# Patient Record
Sex: FEMALE | Race: WHITE | HISPANIC OR LATINO | Employment: UNEMPLOYED | URBAN - METROPOLITAN AREA
[De-identification: names, ages, dates, MRNs, and addresses within clinical notes are randomized per-mention and may not be internally consistent; named-entity substitution may affect disease eponyms.]

---

## 2023-06-14 ENCOUNTER — APPOINTMENT (OUTPATIENT)
Dept: LAB | Facility: CLINIC | Age: 28
End: 2023-06-14

## 2023-06-14 ENCOUNTER — HOSPITAL ENCOUNTER (OUTPATIENT)
Dept: RADIOLOGY | Facility: HOSPITAL | Age: 28
End: 2023-06-14

## 2023-06-14 DIAGNOSIS — Z3A.09 9 WEEKS GESTATION OF PREGNANCY: ICD-10-CM

## 2023-06-14 LAB
ABO GROUP BLD: NORMAL
BASOPHILS # BLD AUTO: 0.06 THOUSANDS/ÂΜL (ref 0–0.1)
BASOPHILS NFR BLD AUTO: 0 % (ref 0–1)
BLD GP AB SCN SERPL QL: NEGATIVE
EOSINOPHIL # BLD AUTO: 0.01 THOUSAND/ÂΜL (ref 0–0.61)
EOSINOPHIL NFR BLD AUTO: 0 % (ref 0–6)
ERYTHROCYTE [DISTWIDTH] IN BLOOD BY AUTOMATED COUNT: 13.1 % (ref 11.6–15.1)
HBV SURFACE AB SER-ACNC: 357 MIU/ML
HBV SURFACE AG SER QL: NORMAL
HCT VFR BLD AUTO: 37.8 % (ref 34.8–46.1)
HCV AB SER QL: NORMAL
HGB BLD-MCNC: 12.6 G/DL (ref 11.5–15.4)
HIV 1+2 AB+HIV1 P24 AG SERPL QL IA: NORMAL
HIV 2 AB SERPL QL IA: NORMAL
HIV1 AB SERPL QL IA: NORMAL
HIV1 P24 AG SERPL QL IA: NORMAL
IMM GRANULOCYTES # BLD AUTO: 0.12 THOUSAND/UL (ref 0–0.2)
IMM GRANULOCYTES NFR BLD AUTO: 1 % (ref 0–2)
LYMPHOCYTES # BLD AUTO: 1.52 THOUSANDS/ÂΜL (ref 0.6–4.47)
LYMPHOCYTES NFR BLD AUTO: 10 % (ref 14–44)
MCH RBC QN AUTO: 30.9 PG (ref 26.8–34.3)
MCHC RBC AUTO-ENTMCNC: 33.3 G/DL (ref 31.4–37.4)
MCV RBC AUTO: 93 FL (ref 82–98)
MONOCYTES # BLD AUTO: 0.95 THOUSAND/ÂΜL (ref 0.17–1.22)
MONOCYTES NFR BLD AUTO: 6 % (ref 4–12)
NEUTROPHILS # BLD AUTO: 12.67 THOUSANDS/ÂΜL (ref 1.85–7.62)
NEUTS SEG NFR BLD AUTO: 83 % (ref 43–75)
NRBC BLD AUTO-RTO: 0 /100 WBCS
PLATELET # BLD AUTO: 256 THOUSANDS/UL (ref 149–390)
PMV BLD AUTO: 9.1 FL (ref 8.9–12.7)
RBC # BLD AUTO: 4.08 MILLION/UL (ref 3.81–5.12)
RH BLD: POSITIVE
RUBV IGG SERPL IA-ACNC: 164.3 IU/ML
SPECIMEN EXPIRATION DATE: NORMAL
TREPONEMA PALLIDUM IGG+IGM AB [PRESENCE] IN SERUM OR PLASMA BY IMMUNOASSAY: NORMAL
WBC # BLD AUTO: 15.33 THOUSAND/UL (ref 4.31–10.16)

## 2023-06-14 PROCEDURE — 86803 HEPATITIS C AB TEST: CPT

## 2023-06-14 PROCEDURE — 87086 URINE CULTURE/COLONY COUNT: CPT

## 2023-06-14 PROCEDURE — 85025 COMPLETE CBC W/AUTO DIFF WBC: CPT

## 2023-06-14 PROCEDURE — 87389 HIV-1 AG W/HIV-1&-2 AB AG IA: CPT

## 2023-06-14 PROCEDURE — 86706 HEP B SURFACE ANTIBODY: CPT

## 2023-06-14 PROCEDURE — 86850 RBC ANTIBODY SCREEN: CPT

## 2023-06-14 PROCEDURE — 87340 HEPATITIS B SURFACE AG IA: CPT

## 2023-06-14 PROCEDURE — 86901 BLOOD TYPING SEROLOGIC RH(D): CPT

## 2023-06-14 PROCEDURE — 86900 BLOOD TYPING SEROLOGIC ABO: CPT

## 2023-06-14 PROCEDURE — 36415 COLL VENOUS BLD VENIPUNCTURE: CPT

## 2023-06-14 PROCEDURE — 86780 TREPONEMA PALLIDUM: CPT

## 2023-06-14 PROCEDURE — 86762 RUBELLA ANTIBODY: CPT

## 2023-06-15 LAB — BACTERIA UR CULT: NORMAL

## 2023-06-19 ENCOUNTER — ROUTINE PRENATAL (OUTPATIENT)
Dept: OBGYN CLINIC | Facility: CLINIC | Age: 28
End: 2023-06-19

## 2023-06-19 VITALS
RESPIRATION RATE: 18 BRPM | HEART RATE: 87 BPM | SYSTOLIC BLOOD PRESSURE: 98 MMHG | WEIGHT: 178.8 LBS | HEIGHT: 61 IN | BODY MASS INDEX: 33.76 KG/M2 | DIASTOLIC BLOOD PRESSURE: 67 MMHG

## 2023-06-19 DIAGNOSIS — Z34.91 PRENATAL CARE IN FIRST TRIMESTER: Primary | ICD-10-CM

## 2023-06-19 DIAGNOSIS — Z3A.13 13 WEEKS GESTATION OF PREGNANCY: ICD-10-CM

## 2023-06-19 DIAGNOSIS — O23.41 URINARY TRACT INFECTION AFFECTING CARE OF MOTHER IN FIRST TRIMESTER, ANTEPARTUM: ICD-10-CM

## 2023-06-19 PROCEDURE — 87491 CHLMYD TRACH DNA AMP PROBE: CPT | Performed by: OBSTETRICS & GYNECOLOGY

## 2023-06-19 PROCEDURE — 87086 URINE CULTURE/COLONY COUNT: CPT | Performed by: OBSTETRICS & GYNECOLOGY

## 2023-06-19 PROCEDURE — 87591 N.GONORRHOEAE DNA AMP PROB: CPT | Performed by: OBSTETRICS & GYNECOLOGY

## 2023-06-19 PROCEDURE — 99213 OFFICE O/P EST LOW 20 MIN: CPT | Performed by: OBSTETRICS & GYNECOLOGY

## 2023-06-19 PROCEDURE — G0145 SCR C/V CYTO,THINLAYER,RESCR: HCPCS | Performed by: OBSTETRICS & GYNECOLOGY

## 2023-06-19 NOTE — ASSESSMENT & PLAN NOTE
-Prenatal panel components reviewed  -Gonorrhea/chlamydia screening collected today  -Cervical cancer screening collected today  -Ultrasounds and aneuploidy screening: has MFM appt 6/20  -MSAFP screening to be ordered at next routine prenatal visit  -LDASA not indicated  -Postpartum contraception plan is DMPA  -Return to office in 4 weeks; precautions discussed

## 2023-06-19 NOTE — PROGRESS NOTES
[facility]  Prenatal H&P Visit    Problem List Items Addressed This Visit        Genitourinary    Urinary tract infection affecting care of mother in first trimester, antepartum     Staph saphrophyticus on 23  S/p treatment with Keflex  Test of cure collected 23         Relevant Orders    Urine culture       Other    13 weeks gestation of pregnancy     -Prenatal panel components reviewed  -Gonorrhea/chlamydia screening collected today  -Cervical cancer screening collected today  -Ultrasounds and aneuploidy screening: has MFM appt   -MSAFP screening to be ordered at next routine prenatal visit  -LDASA not indicated  -Postpartum contraception plan is DMPA  -Return to office in 4 weeks; precautions discussed             Relevant Orders    Chlamydia/GC amplified DNA by PCR    Liquid-based pap, screening   Other Visit Diagnoses     Prenatal care in first trimester    -  Primary    Relevant Orders    Urine culture    Chlamydia/GC amplified DNA by PCR    Liquid-based pap, screening         # [id number]    Claudine Jaramillo MD  PGY-III, OB/GYN    History of Present Illness   Leoncio Pack is a 24 y o  [id number] who presents for prenatal H&P visit at 13w3d gestation  Planned pregnancy  Final dating by first trimester ultrasound  Denies obstetric complaints including cramping, vaginal bleeding, leakage of fluid  2 previous pregnancies: 1 full term  and 1 SAB  Complications: none  Current medication(s) include prenatal vitamin and Vitamin B6 + Unisom with as needed Tylenol  Pertinent gynecological diagnoses: denies sexually transmitted infections, abnormal pap smears, fibroids, ovarian cysts, or endometriosis  She is unemployed at this time but has a support system at home  She is here today with her   She denies a family history of inheritable conditions such as physical or intellectual disabilities, birth defects, blood disorders, heart or neural tube defects  No recent travel or travel planned in the near future  She denies use of cigarettes, vaping, hookah, alcohol or other recreational/illlicit drug use  Review of Systems   Negative unless stated above    Historical Information     Past Medical History:   Diagnosis Date   • Lumbar hernia        History reviewed  No pertinent surgical history  Family History   Problem Relation Age of Onset   • No Known Problems Mother    • Hypertension Father    • No Known Problems Sister    • No Known Problems Brother    • No Known Problems Son    • Cancer Maternal Grandfather    • Stomach cancer Maternal Uncle        Social History     Socioeconomic History   • Marital status: /Civil Union     Spouse name: Not on file   • Number of children: Not on file   • Years of education: Not on file   • Highest education level: Not on file   Occupational History   • Not on file   Tobacco Use   • Smoking status: Never   • Smokeless tobacco: Never   Vaping Use   • Vaping Use: Never used   Substance and Sexual Activity   • Alcohol use: Not Currently   • Drug use: Never   • Sexual activity: Yes     Partners: Male     Birth control/protection: None   Other Topics Concern   • Not on file   Social History Narrative   • Not on file     Social Determinants of Health     Financial Resource Strain: Medium Risk (6/7/2023)    Overall Financial Resource Strain (CARDIA)    • Difficulty of Paying Living Expenses: Somewhat hard   Food Insecurity: No Food Insecurity (6/7/2023)    Hunger Vital Sign    • Worried About Running Out of Food in the Last Year: Never true    • Ran Out of Food in the Last Year: Never true   Transportation Needs: No Transportation Needs (6/7/2023)    PRAPARE - Transportation    • Lack of Transportation (Medical): No    • Lack of Transportation (Non-Medical):  No   Physical Activity: Not on file   Stress: Not on file   Social Connections: Not on file   Intimate Partner Violence: Not on file   Housing Stability: Not on file Meds/Allergies   No Known Allergies    Objective   Vitals:    06/19/23 0805   BP: 98/67   Pulse: 87   Resp: 18       Physical Exam    Physical Exam:  General:   alert and oriented, in no acute distress, appears stated age and cooperative   Heart: regular rate and rhythm, S1, S2 normal, no murmur, click, rub or gallop   Lungs: clear to auscultation bilaterally   Breasts: breasts appear normal, no suspicious masses, no skin or nipple changes or axillary nodes, not examined   Abdomen: soft, non-tender, without masses or organomegaly   Vulva: normal   Vagina: normal mucosa, normal discharge   Cervix: multiparous appearance, no lesions and small amount of bleeding with pap smear   Uterus: size consistent with 13 weeks   Fetal heart tones: Fetal Heart Rate: 156   Adnexa: no mass, fullness, tenderness     Prenatal Labs: I have personally reviewed pertinent reports

## 2023-06-20 ENCOUNTER — ROUTINE PRENATAL (OUTPATIENT)
Dept: PERINATAL CARE | Facility: OTHER | Age: 28
End: 2023-06-20

## 2023-06-20 VITALS
WEIGHT: 179.8 LBS | HEIGHT: 62 IN | BODY MASS INDEX: 33.09 KG/M2 | DIASTOLIC BLOOD PRESSURE: 64 MMHG | SYSTOLIC BLOOD PRESSURE: 100 MMHG | HEART RATE: 90 BPM

## 2023-06-20 DIAGNOSIS — Z3A.13 13 WEEKS GESTATION OF PREGNANCY: ICD-10-CM

## 2023-06-20 DIAGNOSIS — Z36.82 NUCHAL TRANSLUCENCY OF FETUS ON PRENATAL ULTRASOUND: Primary | ICD-10-CM

## 2023-06-20 LAB — BACTERIA UR CULT: NORMAL

## 2023-06-20 PROCEDURE — 99203 OFFICE O/P NEW LOW 30 MIN: CPT | Performed by: OBSTETRICS & GYNECOLOGY

## 2023-06-20 PROCEDURE — 76813 OB US NUCHAL MEAS 1 GEST: CPT | Performed by: OBSTETRICS & GYNECOLOGY

## 2023-06-20 NOTE — LETTER
2023     Azael Trujillo MD  [facility]  [address] [address]    Patient: Fransisca Tsai   YOB: 1995   Date of Visit: 2023       Dear Dr Shelley Kidd: Thank you for referring Fransisca Tsai to me for evaluation  Below are my notes for this consultation  If you have questions, please do not hesitate to call me  I look forward to following your patient along with you  Sincerely,        Nereyda Harding MD        CC: No Recipients    Nereyda Harding MD  2023 11:18 PM  Sign when Signing Visit  OFFICE CONSULT  Referring physician: Danielle Ramirez Md  [facility]  [facility],  [address] [facility]      Dear Dr Zi Corrales      Thank you for requesting a  consultation on your patient Ms Fransisca Tsai for the following indications:  Genetic screening  I used the interpretor # [id number] to contact the patient by phone after her visit  History  Medications: Prenatal vitamins, vitamin B6, Unisom,  Allergies to medications: None  Past medical history: History of UTI in the beginning of this pregnancy  Past surgical history: None  Past obstetrical history:   10/19/2012 5 pound 11 7 ounce baby boy delivered vaginally  2018 at 6 weeks she had a miscarriage spontaneously  Social history: Does not report substance use  First generation family history: Hypertension in her father    Ultrasound findings: The ultrasound shows a fetus concordant with dates  The nasal bone and nuchal translucency appears normal  No malformations are seen on today's early ultrasound  The patient was informed of the findings and counseled about the limitations of the exam in detecting all forms of fetal congenital abnormalities  She does not report any vaginal bleeding or uterine cramping or contractions  Specific counseling was provided on the following problems:  1   We discussed the options for genetic screening which include invasive testing on the fetal placenta or on fetal skin cells within the amniotic fluid and compared this to noninvasive testing which includes cell free DNA screening and the sequential screen  We reviewed the risks, the benefits and the limitations of each  In the end she is considering cell free DNA  She is aware to contact her office to schedule a nurse visit [phone] for a blood draw once she confirms her interest in cell free DNA  Future tests recommended:  1  The results of her NIPT will return in 7-10 days once the blood is drawn  Screening for spina bifida with an MSAFP screen is a future test that can be prescribed through her OB office  This blood work should be drawn preferably at 16 to 18 weeks so that the results return prior to her next scan  The test though can be run until 21 weeks and 6 days if needed  Future ultrasounds ordered today:   1  Fetal Level II ultrasound imaging is recommended at 19-20 weeks' gestation      Aydee Hewitt MD

## 2023-06-21 LAB
C TRACH DNA SPEC QL NAA+PROBE: NEGATIVE
N GONORRHOEA DNA SPEC QL NAA+PROBE: NEGATIVE

## 2023-06-22 NOTE — RESULT ENCOUNTER NOTE
Spoke to patient to confirm neg urine  GCCT results   Patient stated understanding and had no questions

## 2023-06-23 ENCOUNTER — PATIENT OUTREACH (OUTPATIENT)
Dept: OBGYN CLINIC | Facility: CLINIC | Age: 28
End: 2023-06-23

## 2023-06-23 LAB
LAB AP GYN PRIMARY INTERPRETATION: NORMAL
Lab: NORMAL

## 2023-06-26 ENCOUNTER — TELEPHONE (OUTPATIENT)
Dept: OBGYN CLINIC | Facility: CLINIC | Age: 28
End: 2023-06-26

## 2023-06-26 NOTE — TELEPHONE ENCOUNTER
----- Message from Benjei Julian MD sent at 6/24/2023  3:09 PM EDT -----  Please let Alexander Hall know her pap smear is normal  Next will be due in 3 years  Claudine Tran MD  PGY-III, OB/GYN  6/24/2023, 3:09 PM

## 2023-06-27 ENCOUNTER — PATIENT OUTREACH (OUTPATIENT)
Dept: OBGYN CLINIC | Facility: CLINIC | Age: 28
End: 2023-06-27

## 2023-06-28 ENCOUNTER — CLINICAL SUPPORT (OUTPATIENT)
Facility: HOSPITAL | Age: 28
End: 2023-06-28

## 2023-06-28 DIAGNOSIS — Z33.1 PREGNANT STATE, INCIDENTAL: ICD-10-CM

## 2023-06-28 DIAGNOSIS — Z36.9 UNSPECIFIED ANTENATAL SCREENING: ICD-10-CM

## 2023-06-28 NOTE — PROGRESS NOTES
"Used [employer] ID# [id number] Adelina Westbrook  Patient chose to have [facility] Non-invasive Prenatal Screen with fetal sex  Patient given brochure and is aware [facility] will contact their insurance and coordinate coverage  Patient made aware she will need to respond to text message or e-mail from [employer] within 2 business days or testing will be run through insurance  Patient informed text message will come from area code  \"415\"  Provided [employer] # [phone] and web site : [email or web address]  \"[place] your test online\" card with barcode and test tube ID provided to patient  Reviewed [facility]'s web site states 5-7 business days for results via their portal    Docitt message will be sent to patient when MFM receives results /provider reviews  2 vials of blood drawn from RIGHT  arm by ELYSSA Mak RN  Patient tolerated blood draw without difficulty  Specimens labeled with patient identifiers (name, date of birth, specimen collection date), order and specimen were verified with patient, packed and sent via [employer]  FED EX  tracking #  [id number]  Copy of lab order scanned to Epic media  Maternal Fetal Medicine will have results in approximately 7-10 business days and will call patient or notify via [address]  Patient aware viewing lab result online will reveal fetal sex if ordered  Patient verbalized understanding of all instructions and no questions at this time    "

## 2023-07-06 ENCOUNTER — PATIENT OUTREACH (OUTPATIENT)
Dept: OBGYN CLINIC | Facility: CLINIC | Age: 28
End: 2023-07-06

## 2023-07-06 ENCOUNTER — TELEPHONE (OUTPATIENT)
Dept: PERINATAL CARE | Facility: CLINIC | Age: 28
End: 2023-07-06

## 2023-07-06 NOTE — PROGRESS NOTES
Chart review indicates an order was placed to follow up with patient for prenatal assessment, 1st trimester care. [facility] has attempted multiple times to outreach patient without success. PHQ-9 negative at initial prenatal visit. [facility] outreached 23 yo    woman for prenatal assessment via [employer] ID [id number]. Mom has one other child who is 7 yo. She lives with 7 yo and .  is not father of the 10 you. His father has never been involved. Pregnancy was planned and they were happy when they found out and continue to be excited. They have informed other family members, whom are happy. However, mom does not have friends/family locally as supports as they relocated here from [place] one year ago. Mom is not employed and is not looking for employment. Spouse is employed and provider. They are not eligible for government benefits. Mom only concern is food when the baby comes, she denies need food at this time. [facility] e-mailed her resources for [facility] and sent an e-mail via Find Help with local food pantries. [facility] also spoke to her about the Maternal Home Child Visit program through [facility] due to lack of supports and she would like to be enrolled. She would also like a car seat. She was also interested in agencies for other baby supplies as well, [facility] e-mail her Children Resource List to e-mail on file. Mom expressed concern about  bills. [facility] explained to her that she needs to call the billing office and if she does not qualify for MA then she can apply for [facility] care. She will call the billing office. Mom has approved [facility] SFS application [id number]/79/32-65. Spouse has a car and provides transportation. Mom denies any MH hx. Denies any depression this pregnancy or with prior pregnancy. Mom notes occasion alcohol use prior to pregnancy. No D&A or nicotine during pregnancy. No legal hx. Denies any abuse/C&Y involvement with 7 yo or her own childhood.  Denies being in danger at this time and feels safe in her home. She denied any other needs at this time and will reach out to [facility] if needs arise. ALHAJICM to follow.

## 2023-07-06 NOTE — TELEPHONE ENCOUNTER
----- Message from Roberto Barron MD sent at 7/3/2023 10:54 AM EDT -----  I have reviewed the patient's lab results which are normal.  Please contact the patient to inform her of the normal results, unless Ms.  Leatha Fermin has already reviewed the results using [employer]

## 2023-07-06 NOTE — TELEPHONE ENCOUNTER
Attempted to call Pt. With English line (FRANCES, ID [id number]) but Pt.  Has not set up voicemail on her phone, unable to leave message

## 2023-07-13 ENCOUNTER — TELEPHONE (OUTPATIENT)
Dept: OBGYN CLINIC | Facility: CLINIC | Age: 28
End: 2023-07-13

## 2023-07-13 DIAGNOSIS — Z3A.13 13 WEEKS GESTATION OF PREGNANCY: Primary | ICD-10-CM

## 2023-07-13 NOTE — TELEPHONE ENCOUNTER
Called patient to inform her MSAFP lab order was placed and needs to be completed ASAP, VM is not set up, unable to leave a message.

## 2023-07-19 ENCOUNTER — TELEPHONE (OUTPATIENT)
Facility: HOSPITAL | Age: 28
End: 2023-07-19

## 2023-07-19 NOTE — TELEPHONE ENCOUNTER
Tried calling patient using [address] [address] , Brett Newsome (FS#[id number]), to inform her of the time change for her upcoming appointment on 8/7/23 however there was no answer and her voicemail is not set up yet and she does not have mychart.

## 2023-07-20 ENCOUNTER — ROUTINE PRENATAL (OUTPATIENT)
Dept: OBGYN CLINIC | Facility: CLINIC | Age: 28
End: 2023-07-20

## 2023-07-20 VITALS
SYSTOLIC BLOOD PRESSURE: 113 MMHG | BODY MASS INDEX: 33.68 KG/M2 | DIASTOLIC BLOOD PRESSURE: 54 MMHG | WEIGHT: 183 LBS | HEIGHT: 62 IN | HEART RATE: 81 BPM

## 2023-07-20 DIAGNOSIS — Z3A.17 17 WEEKS GESTATION OF PREGNANCY: Primary | ICD-10-CM

## 2023-07-20 PROCEDURE — 99213 OFFICE O/P EST LOW 20 MIN: CPT | Performed by: OBSTETRICS & GYNECOLOGY

## 2023-07-20 NOTE — PROGRESS NOTES
OB/GYN Prenatal Visit    SUBJECTIVE:   #[id number]   Woodford Cowden is a 24 y.o.  at 17+6 here for prenatal visit. She has no obstetric complaints and denies pelvic pain, cramping/contractions, vaginal bleeding, loss of fluid. Has not yet felt baby move. If you are experiencing painful contractions, loss of fluids, vaginal bleeding, or decreased fetal movement, please call ask to speak to OBGYN doctor on call prior to proceeding to [facility] 2nd floor of [address] [place] [address] or [facility] 3rd floor [facility]). We have a resident doctor on call at both [facility] 24 hours a day. OBJECTIVE:  Vitals:    23 1400   BP: 113/54   Pulse: 81     FHT: 148 bpm    Physical Exam  Constitutional:       Appearance: Normal appearance. HENT:      Head: Normocephalic and atraumatic. Neurological:      Mental Status: She is alert.        ASSESSMENT / PLAN:    Problem List        Genitourinary    Urinary tract infection affecting care of mother in first trimester, antepartum    Overview     Staph saphrophyticus on 23, S/p treatment with Keflex  Test of cure 23 was negative            Other    17 weeks gestation of pregnancy    Overview     -Prenatal labs notable for UTI  -Gonorrhea/chlamydia screening negative   -Cervical cancer screening was NILM   -Ultrasounds: NT scan wnl on   -Aneuploidy screening: considering cell free DNA; if she doesn't want cfDNA, please offer quad screen at next visit   -MSAFP screening to be ordered at next visit  -LDASA not indicated  -Contraception plan is DMPA  -Return to office in 4 weeks           Current Assessment & Plan     Doing well today  cfDNA completed, wnl  MSAFP ordered  Return to office in 4 weeks            Christiano Magaña MD  2023  2:58 PM

## 2023-08-07 ENCOUNTER — ROUTINE PRENATAL (OUTPATIENT)
Facility: HOSPITAL | Age: 28
End: 2023-08-07
Payer: COMMERCIAL

## 2023-08-07 ENCOUNTER — LAB (OUTPATIENT)
Dept: LAB | Facility: CLINIC | Age: 28
End: 2023-08-07
Payer: COMMERCIAL

## 2023-08-07 VITALS
BODY MASS INDEX: 33.84 KG/M2 | SYSTOLIC BLOOD PRESSURE: 108 MMHG | HEART RATE: 94 BPM | DIASTOLIC BLOOD PRESSURE: 62 MMHG | WEIGHT: 185 LBS

## 2023-08-07 DIAGNOSIS — O99.212 MATERNAL OBESITY, ANTEPARTUM, SECOND TRIMESTER: Primary | ICD-10-CM

## 2023-08-07 DIAGNOSIS — Z3A.13 13 WEEKS GESTATION OF PREGNANCY: ICD-10-CM

## 2023-08-07 DIAGNOSIS — Z3A.20 20 WEEKS GESTATION OF PREGNANCY: ICD-10-CM

## 2023-08-07 DIAGNOSIS — Z36.86 ENCOUNTER FOR ANTENATAL SCREENING FOR CERVICAL LENGTH: ICD-10-CM

## 2023-08-07 PROCEDURE — 36415 COLL VENOUS BLD VENIPUNCTURE: CPT

## 2023-08-07 PROCEDURE — 76817 TRANSVAGINAL US OBSTETRIC: CPT | Performed by: OBSTETRICS & GYNECOLOGY

## 2023-08-07 PROCEDURE — 82105 ALPHA-FETOPROTEIN SERUM: CPT

## 2023-08-07 PROCEDURE — 76811 OB US DETAILED SNGL FETUS: CPT | Performed by: OBSTETRICS & GYNECOLOGY

## 2023-08-07 NOTE — LETTER
August 10, 2023     [address] [place] [address] PROVIDER    Patient: Leidy Gordon   YOB: 1995   Date of Visit: 8/7/2023       Dear  Provider: Thank you for referring Leidy Gordon to me for evaluation. Below are my notes for this consultation. If you have questions, please do not hesitate to call me. I look forward to following your patient along with you.          Sincerely,        Amara Bishop MD        CC: No Recipients    Amara Bishop MD  8/10/2023  7:55 AM  Sign when Signing Visit  Please refer to the [facility] ultrasound report in Ob Procedures for additional information regarding today's visit

## 2023-08-07 NOTE — PROGRESS NOTES
Ultrasound Probe Disinfection    A transvaginal ultrasound was performed. Prior to use, disinfection was performed with [employer] Probe serial number A2: [id number] was used.       Josie Horn  08/07/23  1:07 PM

## 2023-08-10 NOTE — PROGRESS NOTES
Please refer to the [facility] ultrasound report in Ob Procedures for additional information regarding today's visit

## 2023-08-11 LAB
2ND TRIMESTER 4 SCREEN SERPL-IMP: NORMAL
AFP ADJ MOM SERPL: 0.73
AFP INTERP AMN-IMP: NORMAL
AFP INTERP SERPL-IMP: NORMAL
AFP INTERP SERPL-IMP: NORMAL
AFP SERPL-MCNC: 40.2 NG/ML
AGE AT DELIVERY: 26.3 YR
GA METHOD: NORMAL
GA: 20.4 WEEKS
IDDM PATIENT QL: NO
MULTIPLE PREGNANCY: NO
NEURAL TUBE DEFECT RISK FETUS: NORMAL %

## 2023-08-21 ENCOUNTER — ROUTINE PRENATAL (OUTPATIENT)
Dept: OBGYN CLINIC | Facility: CLINIC | Age: 28
End: 2023-08-21

## 2023-08-21 VITALS
DIASTOLIC BLOOD PRESSURE: 58 MMHG | BODY MASS INDEX: 34.96 KG/M2 | SYSTOLIC BLOOD PRESSURE: 123 MMHG | WEIGHT: 190 LBS | HEART RATE: 82 BPM | HEIGHT: 62 IN

## 2023-08-21 DIAGNOSIS — Z3A.22 22 WEEKS GESTATION OF PREGNANCY: ICD-10-CM

## 2023-08-21 DIAGNOSIS — Z34.92 PRENATAL CARE IN SECOND TRIMESTER: Primary | ICD-10-CM

## 2023-08-21 PROCEDURE — 99213 OFFICE O/P EST LOW 20 MIN: CPT | Performed by: OBSTETRICS & GYNECOLOGY

## 2023-08-21 NOTE — ASSESSMENT & PLAN NOTE
No obstetric complaints today  Will order 28 week labs at next visit  MSAFP WNL  PNC US - Level II wnl, 28w interval growth/missed anatomy 10/30/23  Contraception: DMPA  Discussed  labor precautions and fetal kick counts    Return to clinic in 4 weeks

## 2023-08-21 NOTE — PROGRESS NOTES
OB/GYN prenatal visit    [employer]  #[id number]    S: 24 y.o. [id number] 22w3d here for PN visit. She has no obstetric complaints, including pelvic pain, contractions, vaginal bleeding, loss of fluid, or decreased fetal movement. O:  Vitals:    23 1600   BP: 123/58   Pulse: 82       Blood Pressure: 123/58  Wt=86.2 kg (190 lb); Body mass index is 34.75 kg/m².; TWG=Not found.   Fetal Heart Rate: 158; Fundal Height (cm): 22 cm    Gen: no acute distress, nonlabored breathing  Abdomen: gravid nontender  Lower extremities: no swelling    A/P:    Problem List Items Addressed This Visit        Other    22 weeks gestation of pregnancy     No obstetric complaints today  Will order 28 week labs at next visit  MSAFP WNL  PNC US - Level II wnl, 28w interval growth/missed anatomy 10/30/23  Contraception: DMPA  Discussed  labor precautions and fetal kick counts    Return to clinic in 4 weeks          Other Visit Diagnoses     Prenatal care in second trimester    -  Primary            Olivia Carranza MD  2023  4:30 PM

## 2023-09-10 PROBLEM — Z3A.25 25 WEEKS GESTATION OF PREGNANCY: Status: ACTIVE | Noted: 2023-06-19

## 2023-09-11 ENCOUNTER — ROUTINE PRENATAL (OUTPATIENT)
Dept: OBGYN CLINIC | Facility: CLINIC | Age: 28
End: 2023-09-11

## 2023-09-11 VITALS
RESPIRATION RATE: 18 BRPM | HEART RATE: 89 BPM | HEIGHT: 62 IN | SYSTOLIC BLOOD PRESSURE: 107 MMHG | BODY MASS INDEX: 35.22 KG/M2 | DIASTOLIC BLOOD PRESSURE: 71 MMHG | WEIGHT: 191.4 LBS

## 2023-09-11 DIAGNOSIS — Z34.92 PRENATAL CARE IN SECOND TRIMESTER: Primary | ICD-10-CM

## 2023-09-11 DIAGNOSIS — Z3A.25 25 WEEKS GESTATION OF PREGNANCY: ICD-10-CM

## 2023-09-11 PROCEDURE — 99213 OFFICE O/P EST LOW 20 MIN: CPT | Performed by: OBSTETRICS & GYNECOLOGY

## 2023-09-11 NOTE — ASSESSMENT & PLAN NOTE
Follow up ultrasound scheduled for 10/30/23  Postpartum contraception plan is DMPA   labor precautions reviewed  Follow up in 3 weeks for 28w visit

## 2023-09-11 NOTE — PROGRESS NOTES
[address] Kristine Hernandez is a [id number] who presents today for routine OB visit at 25w3d. S:  She reports no complaints. Denies uterine contractions or persistent cramping. Denies vaginal bleeding or leaking of fluid. Feeling fetal movement. O:  Blood Pressure: 107/71  Wt=86.8 kg (191 lb 6.4 oz); Body mass index is 35.01 kg/m². Fetal Heart Rate: 148; Fundal Height (cm): 25 cm    Pulm: Non-labored breathing. Abdomen: gravid, soft, non-tender. Extremities: non-tender, no edema. Problem List Items Addressed This Visit        Other    25 weeks gestation of pregnancy     Follow up ultrasound scheduled for 10/30/23  Postpartum contraception plan is DMPA   labor precautions reviewed  Follow up in 3 weeks for 28w visit         Relevant Orders    CBC and Platelet    Glucose, 1H PG    RPR-Syphilis Screening (Total Syphilis IGG/IGM)   Other Visit Diagnoses     Prenatal care in second trimester    -  Primary    Relevant Orders    CBC and Platelet    Glucose, 1H PG    RPR-Syphilis Screening (Total Syphilis IGG/IGM)          Patient to be discussed with Dr. Jaya Rosenberg.   [id number] was used. Claudine Freed MD  PGY-IV, OB/GYN  2023, 4:24 PM

## 2023-09-18 PROBLEM — O23.41 URINARY TRACT INFECTION AFFECTING CARE OF MOTHER IN FIRST TRIMESTER, ANTEPARTUM: Status: RESOLVED | Noted: 2023-06-19 | Resolved: 2023-09-18

## 2023-10-02 ENCOUNTER — ROUTINE PRENATAL (OUTPATIENT)
Dept: OBGYN CLINIC | Facility: CLINIC | Age: 28
End: 2023-10-02

## 2023-10-02 VITALS
DIASTOLIC BLOOD PRESSURE: 56 MMHG | BODY MASS INDEX: 35.88 KG/M2 | WEIGHT: 195 LBS | HEIGHT: 62 IN | SYSTOLIC BLOOD PRESSURE: 114 MMHG | HEART RATE: 86 BPM

## 2023-10-02 DIAGNOSIS — Z34.93 PRENATAL CARE IN THIRD TRIMESTER: Primary | ICD-10-CM

## 2023-10-02 DIAGNOSIS — Z23 NEED FOR TDAP VACCINATION: ICD-10-CM

## 2023-10-02 DIAGNOSIS — Z3A.28 28 WEEKS GESTATION OF PREGNANCY: ICD-10-CM

## 2023-10-02 PROCEDURE — 99214 OFFICE O/P EST MOD 30 MIN: CPT | Performed by: OBSTETRICS & GYNECOLOGY

## 2023-10-02 PROCEDURE — 90471 IMMUNIZATION ADMIN: CPT | Performed by: OBSTETRICS & GYNECOLOGY

## 2023-10-02 PROCEDURE — 90715 TDAP VACCINE 7 YRS/> IM: CPT | Performed by: OBSTETRICS & GYNECOLOGY

## 2023-10-02 NOTE — PROGRESS NOTES
OB/GYN Prenatal Visit    SUBJECTIVE:  Raffy Leon is a 34 y.o.  at 28w3d here for prenatal visit. She has no obstetric complaints and denies pelvic pain, cramping/contractions, vaginal bleeding, loss of fluid, or decreased fetal movement. OBJECTIVE:  Vitals:    10/02/23 1600   BP: 114/56   Pulse: 86     FHT: 151 bpm via doppler  Fundal height: 27.5 cm  SVE: deferred    Physical Exam  Constitutional:       General: She is not in acute distress. Appearance: Normal appearance. She is not ill-appearing. Genitourinary:      Genitourinary Comments: SVE deferred   HENT:      Head: Normocephalic and atraumatic. Mouth/Throat:      Mouth: Mucous membranes are moist. No oral lesions. Eyes:      General: No scleral icterus. Extraocular Movements: Extraocular movements intact. Cardiovascular:      Rate and Rhythm: Normal rate and regular rhythm. Heart sounds: Normal heart sounds. Pulmonary:      Effort: Pulmonary effort is normal. No respiratory distress. Breath sounds: Normal breath sounds. No wheezing, rhonchi or rales. Abdominal:      General: There is no distension. Palpations: Abdomen is soft. Tenderness: There is no abdominal tenderness. There is no guarding. Comments: Gravid   Musculoskeletal:      Right lower leg: No edema. Left lower leg: No edema. Neurological:      General: No focal deficit present. Mental Status: She is alert. Skin:     General: Skin is warm and dry. Psychiatric:         Attention and Perception: Attention normal.         Mood and Affect: Mood normal.         Speech: Speech normal.         Behavior: Behavior normal.         Thought Content: Thought content normal.         Judgment: Judgment normal.   Vitals and nursing note reviewed. Exam conducted with a chaperone present.            ASSESSMENT / PLAN:    Problem List        Other    28 weeks gestation of pregnancy    Overview     -Prenatal labs notable for UTI, neg STEWART   -Gonorrhea/chlamydia screening negative   -Cervical cancer screening was NILM   -Ultrasounds: NT scan wnl on . Level II anatomical survey incomplete on 23. F/u growth and anatomy scheduled for 10/30/23  -Aneuploidy screening: NIPT low risk, MSAFP normal  -LDASA not indicated  -Contraception plan is DMPA  -Vaccinations Tdap received 10/2/23, flu vaccine refused  - Delivery plan  at [facility] with epidural. Delivery consent signed 10/2/23           Current Assessment & Plan     - delivery consent signed.  Reviewed risks and benefits of , OVD, C/S  - T dap received  - flu vaccine discussed and offered, patient refused   - no obstetric complaints today  - encouraged completed of 28 week labs  - discussed upcoming perinatology appointment 10/10/2023  - RTC in 4 weeks or sooner if problems arise        Other Visit Diagnoses     Prenatal care in third trimester    -  Primary    Need for Tdap vaccination                    Kyler Sheriff MD  10/2/2023  7:51 PM

## 2023-10-02 NOTE — ASSESSMENT & PLAN NOTE
- delivery consent signed.  Reviewed risks and benefits of , OVD, C/S  - T dap received  - flu vaccine discussed and offered, patient refused   - no obstetric complaints today  - encouraged completed of 28 week labs  - discussed upcoming perinatology appointment 10/10/2023  - RTC in 4 weeks or sooner if problems arise

## 2023-10-02 NOTE — PROGRESS NOTES
28 week education packet provided to patient on 10/02/23. Included in packet:   Third Trimester paperwork  Delivery consent   Birthing room support person rules and acknowledgment  Birth Plan   Welcome information  Birth certificate worksheet   Consent for Photographers  Perineal/ Vaginal massage   Pediatric practices and locations     [employer] # [id number]  Pt will Breast feed / pt is self pay  TDAP given today  Declined Flu Vaccine

## 2023-10-13 ENCOUNTER — LAB (OUTPATIENT)
Dept: LAB | Facility: CLINIC | Age: 28
End: 2023-10-13
Payer: COMMERCIAL

## 2023-10-13 DIAGNOSIS — O99.810 ABNORMAL GLUCOSE TOLERANCE IN PREGNANCY: Primary | ICD-10-CM

## 2023-10-13 DIAGNOSIS — Z3A.25 25 WEEKS GESTATION OF PREGNANCY: ICD-10-CM

## 2023-10-13 DIAGNOSIS — Z34.92 PRENATAL CARE IN SECOND TRIMESTER: ICD-10-CM

## 2023-10-13 LAB
ERYTHROCYTE [DISTWIDTH] IN BLOOD BY AUTOMATED COUNT: 12.9 % (ref 11.6–15.1)
GLUCOSE 1H P 50 G GLC PO SERPL-MCNC: 170 MG/DL (ref 40–134)
HCT VFR BLD AUTO: 38.6 % (ref 34.8–46.1)
HGB BLD-MCNC: 12.1 G/DL (ref 11.5–15.4)
MCH RBC QN AUTO: 29.5 PG (ref 26.8–34.3)
MCHC RBC AUTO-ENTMCNC: 31.3 G/DL (ref 31.4–37.4)
MCV RBC AUTO: 94 FL (ref 82–98)
PLATELET # BLD AUTO: 276 THOUSANDS/UL (ref 149–390)
PMV BLD AUTO: 8.7 FL (ref 8.9–12.7)
RBC # BLD AUTO: 4.1 MILLION/UL (ref 3.81–5.12)
TREPONEMA PALLIDUM IGG+IGM AB [PRESENCE] IN SERUM OR PLASMA BY IMMUNOASSAY: NORMAL
WBC # BLD AUTO: 9.31 THOUSAND/UL (ref 4.31–10.16)

## 2023-10-13 PROCEDURE — 82950 GLUCOSE TEST: CPT

## 2023-10-13 PROCEDURE — 36415 COLL VENOUS BLD VENIPUNCTURE: CPT

## 2023-10-13 PROCEDURE — 86780 TREPONEMA PALLIDUM: CPT

## 2023-10-13 PROCEDURE — 85027 COMPLETE CBC AUTOMATED: CPT

## 2023-10-13 NOTE — RESULT ENCOUNTER NOTE
Spoke to patient to advised of elevated 1 hr Glucose exam and order placed for 3 hr exam that is fasted 8hrs fasted. Patient also informed of CBC wnl and pending syphilis screen.  Maria Luisa Do stated understanding and had no further questions

## 2023-10-14 NOTE — RESULT ENCOUNTER NOTE
Negative syphilis screening. Please let patient know. Claudine Torres MD  PGY-IV, OB/GYN  10/13/2023, 8:24 PM

## 2023-10-16 ENCOUNTER — TELEPHONE (OUTPATIENT)
Dept: OBGYN CLINIC | Facility: CLINIC | Age: 28
End: 2023-10-16

## 2023-10-16 NOTE — TELEPHONE ENCOUNTER
----- Message from Dev Fernandes MD sent at 10/13/2023  8:24 PM EDT -----  Negative syphilis screening. Please let patient know. Claudine Alfredo MD  PGY-IV, OB/GYN  10/13/2023, 8:24 PM

## 2023-10-16 NOTE — TELEPHONE ENCOUNTER
Called patient to inform of negative syphilis screening, unable to leave message, VM not set up. Please let patient know if she returns call.

## 2023-10-20 ENCOUNTER — LAB (OUTPATIENT)
Dept: LAB | Facility: CLINIC | Age: 28
End: 2023-10-20
Payer: COMMERCIAL

## 2023-10-20 DIAGNOSIS — O99.810 ABNORMAL GLUCOSE TOLERANCE IN PREGNANCY: ICD-10-CM

## 2023-10-20 DIAGNOSIS — O99.810 ABNORMAL GLUCOSE TOLERANCE IN PREGNANCY: Primary | ICD-10-CM

## 2023-10-20 PROBLEM — Z3A.31 31 WEEKS GESTATION OF PREGNANCY: Status: ACTIVE | Noted: 2023-06-19

## 2023-10-20 LAB
GLUCOSE 1H P 100 G GLC PO SERPL-MCNC: 202 MG/DL (ref 65–179)
GLUCOSE 2H P 100 G GLC PO SERPL-MCNC: 181 MG/DL (ref 65–154)
GLUCOSE 3H P 100 G GLC PO SERPL-MCNC: 115 MG/DL (ref 65–139)
GLUCOSE P FAST SERPL-MCNC: 82 MG/DL (ref 65–94)

## 2023-10-20 PROCEDURE — 82952 GTT-ADDED SAMPLES: CPT

## 2023-10-20 PROCEDURE — 82951 GLUCOSE TOLERANCE TEST (GTT): CPT

## 2023-10-20 PROCEDURE — 36415 COLL VENOUS BLD VENIPUNCTURE: CPT

## 2023-10-20 NOTE — RESULT ENCOUNTER NOTE
Abnormal 3-hr GTT. Referral placed to diabetes education. Please let patient know. Thanks,  Claudine Clark MD  PGY-IV, OB/GYN  10/20/2023, 1:02 PM

## 2023-10-27 ENCOUNTER — OFFICE VISIT (OUTPATIENT)
Facility: HOSPITAL | Age: 28
End: 2023-10-27

## 2023-10-27 VITALS
HEART RATE: 94 BPM | DIASTOLIC BLOOD PRESSURE: 66 MMHG | SYSTOLIC BLOOD PRESSURE: 110 MMHG | OXYGEN SATURATION: 98 % | WEIGHT: 195.1 LBS | BODY MASS INDEX: 35.68 KG/M2

## 2023-10-27 DIAGNOSIS — O24.410 DIET CONTROLLED GESTATIONAL DIABETES MELLITUS (GDM) IN THIRD TRIMESTER: Primary | ICD-10-CM

## 2023-10-27 DIAGNOSIS — R73.09 ELEVATED GLUCOSE: ICD-10-CM

## 2023-10-27 DIAGNOSIS — O99.810 ABNORMAL GLUCOSE TOLERANCE IN PREGNANCY: ICD-10-CM

## 2023-10-27 DIAGNOSIS — Z3A.32 32 WEEKS GESTATION OF PREGNANCY: ICD-10-CM

## 2023-10-27 PROCEDURE — G0108 DIAB MANAGE TRN  PER INDIV: HCPCS

## 2023-10-27 RX ORDER — BLOOD-GLUCOSE METER
EACH MISCELLANEOUS
Qty: 100 STRIP | Refills: 4 | Status: SHIPPED | OUTPATIENT
Start: 2023-10-27 | End: 2023-12-22

## 2023-10-27 RX ORDER — LANCETS 30 GAUGE
EACH MISCELLANEOUS
Qty: 1 KIT | Refills: 0 | Status: SHIPPED | OUTPATIENT
Start: 2023-10-27 | End: 2023-12-22

## 2023-10-27 RX ORDER — LANCETS 33 GAUGE
EACH MISCELLANEOUS
Qty: 100 EACH | Refills: 4 | Status: SHIPPED | OUTPATIENT
Start: 2023-10-27 | End: 2023-12-22

## 2023-10-27 NOTE — PROGRESS NOTES
CLASS 1 - Individual  (in-person office visit )    Thank you for referring your patient to [facility] Maternal Fetal Medicine Diabetes and Pregnancy Program.     Subjective:     Teri Garces is a 34 y.o. female who presents today unaccompanied for Gestational Diabetes   Algerian language line used. Interpretor # - [id number]    Patient is at 32w0d gestation, Estimated Date of Delivery: 23. Learns best by: Reading/Writing (Note Taking, Reading Provided Booklet)  Primary Support Person: Spouse  Maximiliano with stress by: Reading and Walking  How do you feel the diabetes diagnosis will affect the rest of your pregnancy? Answer: "Its difficult to get used to another lifestyle, but it is necessary "     Reviewed and updated the following from patients medical record: Demographics, Education, Occupation, PMH, Problem List, Allergies, and Current Medications. D&P Assessment responses have been reported throughout the following note. Visit Diagnosis:  Encounter Diagnosis     ICD-10-CM    1. Diet controlled gestational diabetes mellitus (GDM) in third trimester  O24.410       2. 32 weeks gestation of pregnancy  Z3A.32       3. Elevated glucose  R73.09            Discussed with patient:  Pathophysiology of Diet controlled gestational diabetes mellitus (GDM) in third trimester [O24.410]. Untreated hyperglycemia in pregnancy and maternal fetal complications (fetal macrosomia,  hypoglycemia, polyhydramnios, increased incidence of  section,  labor, and in severe cases fetal demise and still birth). Importance of blood glucose monitoring, nutrition, and medication if necessary in achieving BG goals. HPI    Diabetes Hx:   Personal History? No  Family History of Diabetes?  No     Family History   Problem Relation Age of Onset    No Known Problems Mother     Hypertension Father     No Known Problems Sister     No Known Problems Brother     No Known Problems Son     Cancer Maternal Grandfather     Stomach cancer Maternal Uncle         Pregnancy Overview:   OB History    Para Term  AB Living   3 1 1   1 1   SAB IAB Ectopic Multiple Live Births           1      # Outcome Date GA Lbr Adan/2nd Weight Sex Delivery Anes PTL Lv   3 Current            2 AB 2018 6w0d          1 Term 10/19/12   2600 g (5 lb 11.7 oz) M Vag-Spont  N MIKHAIL       Pregnancy Plan:      Delivery Plans  Planned delivery method: Vaginal  Planned delivery location: [facility] L&D  Planned anesthesia: None  Acceptable blood products: All     Post-Delivery Plans  Feeding intentions: Breast Milk     Labs  GDM LABS:  1 Hour GTT:   Lab Results   Component Value Date/Time    [id number] 170 (H) 10/13/2023 10:21 AM      3 Hour GTT:   Lab Results   Component Value Date/Time    GLUF 82 10/20/2023 09:05 AM    OUXNALC2MQ 202 (H) 10/20/2023 10:17 AM    TADBDZO8HQ 181 (H) 10/20/2023 11:13 AM    XCTLGYP4CL 115 10/20/2023 12:05 PM        A1C:  No results found for: "HGBA1C"     Labs Ordered This Visit: None    Current Outpatient Medications    Current Outpatient Medications:     doxylamine (UNISOM) 25 MG tablet, Take 0.5 tablets (12.5 mg total) by mouth daily at bedtime as needed for sleep (Patient not taking: Reported on 2023), Disp: 30 tablet, Rfl: 0    Prenatal Vit-DSS-Fe Fum-FA (Prenatal 19) tablet, Take 1 tablet by mouth daily, Disp: , Rfl:     Pyridoxine HCl (vitamin B-6) 25 MG tablet, Take 1 tablet (25 mg total) by mouth daily (Patient not taking: Reported on 2023), Disp: 30 tablet, Rfl: 3     Anthropometrics:  Ht Readings from Last 1 Encounters:   10/02/23 5' 2" (1.575 m)      Wt Readings from Last 3 Encounters:   10/02/23 88.5 kg (195 lb)   23 86.8 kg (191 lb 6.4 oz)   23 86.2 kg (190 lb)        Pre-Gravid Wt Pre-Gravid BMI TWG   Pregravid weight not on file Could not be calculated Not found.      Total Pregnancy Weight Gain Recommendations:  could not be calculated   Current Wt Status Compared to Recommendations: *Women should gain 2-5lbs in first trimester; Rate of wt gain in 2nd and 3rd trimester depends on TWG recommendations based on pregravid BMI and height    Most Recent Ultrasound Results:  Findings: NML Growth/NELLI - 8/7/23   Further Fetal Surveillance: None  Next US date: Scheduled Appropriately - 10/30/23     Blood Glucose Monitoring:     Glucose Meter: OneTouch Verio Flex   *orders for supplies (meter, lancets, strips) based on patients needs pended/routed to appropriate provider after today's visit for review/approval     Instructed on Testing Blood Sugars: 4 x per day (Fasting, 2 hour after start of each meal)  Goals: (Fasting) 60-95mg/dL // (2hr PP) <120mg/dL  Meter Teaching: Gave instruction on site selection, skin preparation, loading strips and lancet device, meter activation, obtaining blood sample, test strip and lancet disposal and storage, and recording log book entries. Blood Sugar Tested in Office? No due to no meter   Instructed to report blood sugar results weekly via Phone: [phone] OR [employer] (Message with image attachment, or Glucose Flowsheet)    Meal Plan: Patient was provided with a meal plan including 3 meals and 3 snacks  *Calories: 1800 calorie (CHO: 64-86-66-09-63-41) (PRO: 2-1-3-1-3-2)    Review of Patient's Current Diet:  Type of Diet: Regular   Special or ethnic dietary preferences? no  Food Allergies: None  Food Intolerances: None  Food Dislikes: None  Receiving [facility] or food stamps? Applied/Pending [facility] JACKY)  Frequency of dining out/getting take out? 2 times a month     Typical Timing/Frequency of Meals and Snacks:   # Meals Daily: 3  Timing of Meals: Breakfast: 8am  Lunch: 1pm  Dinner: 7-8pm  # Snacks Daily? 2  Timing of Snacks:  irregular     Meal Plan Tips Reviewed at Today's Visit:  Appropriate amounts of CHO, PRO, and Fat at each meal and snack.    CHO exchange list, and portion sizes for both CHO and PRO via food models  How to read a food label  Suggested meal/snack options to increase nutrition and maintain consistent meal and snack intakes  Eat every 2.0-3.5 hours while awake  Go no longer than 8-10 hours fasting overnight until first meal of the day. Physical Activity:  Currently physically active? Yes, Walking irregularly    Reviewed w/ Pt:   Benefits of physical activity to optimize blood glucose control, encouraged activity at patient is physically able. Instructed pt to always consult a physician prior to starting an exercise program.   Recommend 20-30 minutes daily. Patient Stated Goal: "I will plan my meals and snacks every day"    Expected Compliance: excellent  Barriers to Learning/Change: Language  Diabetes Self Management Support Plan (outside of ongoing care): Spouse/Family     Date to Report Blood Sugars: Day Before Class 2, Then Weekly (till delivery)    Class 2: 11/3/23  *Patient instructed to bring 3 day food diary and/or write down foods associated with elevated after meal blood sugars    Begin Time: 1:00 pm     End Time: 2:20pm    It was a pleasure working with them today. Please feel free to call ([phone]) with any questions or concerns.     Ana Rosa Kunz RN   Diabetes Educator  [facility]  Diabetes and Pregnancy Program  [address] [facility], [address] [place] [address], [address] [facility]

## 2023-10-27 NOTE — PATIENT INSTRUCTIONS
Welcome to The Diabetes and Pregnancy Program at [address] [place] [address] team consists of Myself, [facility] Srinivasan[address] and two certified dieticians/educators: Constantine Sosa). Our medical assistants can be reached at [phone],  Monday thru Friday 8 am - 4:15 pm. We look forward to helping you manage your gestational diabetes during pregnancy. As we discussed and reviewed during class 1 please follow our instructions. Self-monitoring Blood Glucose: OneTouch Verio Flex  Always carry your meter and testing supplies with you at all times. Bring you glucose meter to all your appointments in our office. Check your blood sugar - fasting and 2 hours after the first bite of your meal.   Total of 4 blood sugars each day  Fasting: No less than 8 hours and no more than 10 hours from your bedtime snack. Fasting glucose should be checked when you wake up and before you start your day. Check before you have anything to eat or drink. Goal:  Fasting blood sugar (FBS):  60-90 mg/dL  1 hour after meals:  140 or less   2 hour after meals:  120 or less     Report blood sugars by 11/2/23 . Please only pick ONE way to report to our team. Choose the best option for you. We need your report to be legible with dates and blood sugar readings for FBS and 2 hours after breakfast, lunch and dinner to be distinguished. * 1. [employer] glucose flow sheet: Click on "heart/chart symbol" on the bottom toolbar, center option. You will now see an option for "track my health". Fill out accordingly and remember to hit the save button to submit your readings. *         2. Voicemail [phone]  If no response in 24- 48 hours call again        3. [employer] message - Send to [address]. (Medical question - non urgent)   Can include up to 3 images/attachments per message  If using the One Touch Application for your cell phone please only submit a screen shot of electronic logbook "classic".    Automatically logs and organizes your blood glucose results. How to check your blood sugar:  Wash your hands with soap and water or use waterless  to clean your hands. Alcohol can dry your fingers and is not recommended. Alcohol can also cause false, elevated glucose readings. AVOID scented soaps   Gather your glucose meter kit and supplies. Prepare your meter by inserting a new test strip. This will automatically turn on your meter  Make sure test strips are not . Use a new test strip each time. Prepare your lancing device by inserting the lancet               After the lancet is securely in place, twist off the top to reveal needle. Reattach lancing device top   Once lancing device top is reattached, you are now ready to prick the side of your fingertip to get a blood sample. You can adjust the depth setting as needed. We recommend to start at "4". Apply the blood sample to test strip according to instructions. Make sure your sharp container is: heavy duty plastic, puncture-resistant lid, upright and stable, leak resistant, properly labeled. Record your blood sugar   For references and video: go to - http://www.Simply Measured/    Diet:  Please follow your 1800 calorie (CHO: 89-63-23-51-32-81) (PRO:2-1-3-1-3-2) (see attachment). -Remember 15 grams of carbohydrates = 1 serving of CHO   -Carbohydrates need to be paired with 7 grams or 1 ounce of protein for balance.  -Food label:   -Start by checking the serving size on the label of packaged foods and drinks. -The amount of calories, carbs, and fats on the label is based on one serving. Be careful as many items contain more than one serving per package. First step is to check total sugars. Remember total sugar should equal 15 grams or less. Second step, check the total grams (g) of carbs in one serving. (See CHO graph conversion chart as reference).    You can calculate the number of servings of carbs in one serving by dividing the total carbs by 15. For example, if a food has 30 g of total carbs, it would be equal to 2 servings of carbs. -Carbohydrates always need to be paired with protein.  -You should be consuming a minimum of 175 grams of carbohydrates daily to avoid ketosis. -Remember to eat 3 meals and 3 snacks a day. Eating every 2 to 3.5 hours during the day. -Be sure to have bedtime snack that includes at least 30 grams of carbohydrates and 2 ounces (14 grams) of protein.  -Shop around the outside edge of the grocery store. This includes fresh fruits and vegetables, bulk grains, fresh meats, and fresh dairy.  -Use low-heat cooking methods, such as baking, instead of high-heat cooking methods like deep frying.  -Cook using healthy oils, such as olive, canola, or sunflower oil.  -Eat meals and snacks regularly, preferably at the same times every day. Avoid going long periods of time without eating.              -Eat some foods each day that contain healthy fats, such as avocado, nuts, seeds, and fish.  -Always check the nutrition information of foods labeled as "low-fat" or "nonfat". These foods may be higher in added sugar or refined carbs and should be avoided. Blood sugars:   -Report to our team on a weekly basis until you deliver. - If ok by your OB try adding a 20-30 minute walk after a meal to help keep glucose within range.  -Continue to follow up with your OB and MFM providers as recommended.  -Stay in close contact with diabetes education team.   -Always have glucose available for hypoglycemia, use 15 by 15 rule. See attachment.   -While sick or feeling ill, follow our sick day guidelines in our GDM booklet. See attachment.   -For travel and dining out tips refer to your GDM booklet. See attachment. Before your class 2 on 11/3/23 try to keep a 3 day food log. Include everything you drink and eat for breakfast, AM snack, lunch, PM snack, dinner and bedtime snack.  Our dietician will review during class 2 and can provide feedback. Very important to maintain tight glucose control during pregnancy to decrease risk factors including fetal macrosomia; birth injury; risk of ; polyhydramnios; pre-term labor; pre-eclampsia;  hypoglycemia; jaundice and stillbirth. Any questions give us a call at [phone] or send us a MyChart message to [facility], [address] [place] [address].    Enedelia Murphy RN  The Diabetes and Pregnancy Program at [address] [place] [address]

## 2023-10-30 ENCOUNTER — TELEPHONE (OUTPATIENT)
Facility: HOSPITAL | Age: 28
End: 2023-10-30

## 2023-10-30 ENCOUNTER — ULTRASOUND (OUTPATIENT)
Facility: HOSPITAL | Age: 28
End: 2023-10-30

## 2023-10-30 VITALS
HEIGHT: 62 IN | SYSTOLIC BLOOD PRESSURE: 118 MMHG | BODY MASS INDEX: 35.7 KG/M2 | HEART RATE: 94 BPM | WEIGHT: 194 LBS | DIASTOLIC BLOOD PRESSURE: 70 MMHG

## 2023-10-30 DIAGNOSIS — O24.410 DIET CONTROLLED GESTATIONAL DIABETES MELLITUS (GDM) IN THIRD TRIMESTER: Primary | ICD-10-CM

## 2023-10-30 DIAGNOSIS — Z3A.32 32 WEEKS GESTATION OF PREGNANCY: ICD-10-CM

## 2023-10-30 PROCEDURE — 76816 OB US FOLLOW-UP PER FETUS: CPT | Performed by: OBSTETRICS & GYNECOLOGY

## 2023-10-30 PROCEDURE — 99213 OFFICE O/P EST LOW 20 MIN: CPT | Performed by: OBSTETRICS & GYNECOLOGY

## 2023-10-30 RX ORDER — LANCETS 30 GAUGE
EACH MISCELLANEOUS
Qty: 1 KIT | Refills: 0 | Status: SHIPPED | OUTPATIENT
Start: 2023-10-30

## 2023-10-30 NOTE — TELEPHONE ENCOUNTER
Called patient in regard to glucose meter. Received message from pharmacy ([facility] Odin Balderas)  Onetouch Meter out of stock - on back order. Called patient to confirm if she already purchased onetouch test strips and lancets. She confirmed she did purchase them. I asked if she would give me permission to order meter to a different pharmacy at this time in that case, instead of ordering different  meter. She requested [facility]. . Meter reordered to this pharmacy at this time.

## 2023-10-30 NOTE — PROGRESS NOTES
The patient was seen today for an ultrasound. Please see ultrasound report (located under Ob Procedures) for additional details. Thank you very much for allowing us to participate in the care of this very nice patient. Should you have any questions, please do not hesitate to contact me. [place]ef[place] [address] [facility]  Attending Physician, [address] [place] [address]

## 2023-10-31 ENCOUNTER — ROUTINE PRENATAL (OUTPATIENT)
Dept: OBGYN CLINIC | Facility: CLINIC | Age: 28
End: 2023-10-31

## 2023-10-31 VITALS
HEART RATE: 76 BPM | BODY MASS INDEX: 36.07 KG/M2 | DIASTOLIC BLOOD PRESSURE: 71 MMHG | SYSTOLIC BLOOD PRESSURE: 132 MMHG | HEIGHT: 62 IN | WEIGHT: 196 LBS

## 2023-10-31 DIAGNOSIS — Z3A.32 32 WEEKS GESTATION OF PREGNANCY: ICD-10-CM

## 2023-10-31 DIAGNOSIS — Z23 NEED FOR INFLUENZA VACCINATION: ICD-10-CM

## 2023-10-31 DIAGNOSIS — Z29.11 NEED FOR RSV VACCINATION: ICD-10-CM

## 2023-10-31 DIAGNOSIS — Z34.93 PRENATAL CARE IN THIRD TRIMESTER: Primary | ICD-10-CM

## 2023-10-31 NOTE — PROGRESS NOTES
OB/GYN Prenatal Visit    SUBJECTIVE:  Jay Jackson is a 34 y.o.  at 32w4d here for prenatal visit. She has no obstetric complaints and denies pelvic pain, cramping/contractions, vaginal bleeding, loss of fluid, or decreased fetal movement.      OBJECTIVE:  Vitals:    10/31/23 1500   BP: 132/71   Pulse: 76       FHT: 147 bpm  Fundal height: 32 cm  SVE: declined by patient       ASSESSMENT / PLAN:    Problem List       32 weeks gestation of pregnancy    Overview     Overview:  Labs  Pap smear NILM ; GC/CT neg   Prenatal panel UTI, neg STEWART   28w labs wnl  Vaccines:  Flu vaccine: will receive 10/31  RSV vaccine: will receive 10/31  Tdap vaccine: received 10/2  Genetic screening NIPT wnl, MSAFP wnl  TWG 18 lb  Pre-gravid BMI 30  Recommended weight gain  15-25 lb  Contraception: depo  Feeding plan: breast/bottle together  Birth plan:  with epidural  Delivery consent: signed 10/2/23  Ultrasounds: Level 1  wnl, level II 8/723 incomplete, f/u growth 10/30 wnl    Assessment and Plan:  No obstetric complaints  Discussed  labor precautions and fetal kick counts  Return to clinic in 2 weeks          Current Assessment & Plan     Overview:  Labs  Pap smear NILM ; GC/CT neg   Prenatal panel UTI, neg STEWART   28w labs wnl  Vaccines:  Flu vaccine: will receive 10/31  RSV vaccine: will receive 10/31  Tdap vaccine: received 10/2  Genetic screening NIPT wnl, MSAFP wnl  TWG 18 lb  Pre-gravid BMI 30  Recommended weight gain  15-25 lb  Contraception: depo  Feeding plan: breast/bottle together  Birth plan:  with epidural  Delivery consent: signed 10/2/23  Ultrasounds: Level 1  wnl, level II 8/723 incomplete, f/u growth 10/30    Assessment and Plan:  No obstetric complaints  Discussed  labor precautions and fetal kick counts  Return to clinic in 2 weeks          Abnormal glucose tolerance in pregnancy    Overview     1-hr  10/13/23  3-hr GTT with abnormal 1-hr and 2-hr values.  Referral placed to Diabetes Education 10/20/23         Diet controlled gestational diabetes mellitus (GDM) in third trimester    Current Assessment & Plan       No results found for: "HGBA1C"            Mateo Harding DO  10/31/2023  4:27 PM

## 2023-10-31 NOTE — ASSESSMENT & PLAN NOTE
Overview:  Labs  Pap smear NILM ; GC/CT neg   Prenatal panel UTI, neg STEWART   28w labs wnl  Vaccines:  Flu vaccine: will receive 10/31  RSV vaccine: will receive 10/31  Tdap vaccine: received 10/2  Genetic screening NIPT wnl, MSAFP wnl  TWG 18 lb  Pre-gravid BMI 30  Recommended weight gain  15-25 lb  Contraception: depo  Feeding plan: breast/bottle together  Birth plan:  with epidural  Delivery consent: signed 10/2/23  Ultrasounds: Level 1  wnl, level II  incomplete, f/u growth 10/30    Assessment and Plan:  No obstetric complaints  Discussed  labor precautions and fetal kick counts  Return to clinic in 2 weeks

## 2023-11-03 ENCOUNTER — OFFICE VISIT (OUTPATIENT)
Dept: PERINATAL CARE | Facility: CLINIC | Age: 28
End: 2023-11-03

## 2023-11-03 VITALS
HEIGHT: 62 IN | HEART RATE: 98 BPM | BODY MASS INDEX: 35.66 KG/M2 | SYSTOLIC BLOOD PRESSURE: 110 MMHG | WEIGHT: 193.8 LBS | DIASTOLIC BLOOD PRESSURE: 60 MMHG

## 2023-11-03 DIAGNOSIS — O24.410 DIET CONTROLLED GESTATIONAL DIABETES MELLITUS (GDM) IN THIRD TRIMESTER: Primary | ICD-10-CM

## 2023-11-03 DIAGNOSIS — Z3A.32 32 WEEKS GESTATION OF PREGNANCY: ICD-10-CM

## 2023-11-03 DIAGNOSIS — E66.09 OTHER OBESITY DUE TO EXCESS CALORIES AFFECTING PREGNANCY IN THIRD TRIMESTER: ICD-10-CM

## 2023-11-03 DIAGNOSIS — O99.213 OTHER OBESITY DUE TO EXCESS CALORIES AFFECTING PREGNANCY IN THIRD TRIMESTER: ICD-10-CM

## 2023-11-03 PROCEDURE — G0109 DIAB MANAGE TRN IND/GROUP: HCPCS | Performed by: DIETITIAN, REGISTERED

## 2023-11-03 NOTE — PROGRESS NOTES
Thank you for referring your patient to [facility] Maternal Fetal Medicine Diabetes in Pregnancy Program.     Lizzeth Moncada is a  34 y.o. female who presents today for Individual  Class 2. Patient is at 33w0d gestation, Estimated Date of Delivery: 12/22/23. Luxembourgish speaking mostly--used [employer] interpretor Elier Loera #[id number] for 1/2 hour till connection was lost totally. Then used patient's cell phone interpenetrating monique. For 1/2 hour     Reviewed and updated the following from patients medical record: PMH, Problem List, Allergies, and Current Medications. Visit Diagnosis:  Diet controlled GDM    Additional Pregnancy Complications:  Obesity    Labs:    Lab Results   Component Value Date    [id number] 170 (H) 10/13/2023       Lab Results   Component Value Date    GLUF 82 10/20/2023    DPLVPMH4DO 202 (H) 10/20/2023    KYLQUIY4XV 181 (H) 10/20/2023    YQBLUZW7LI 115 10/20/2023        No components found for: "HGA1C"    Medications:  No diabetes related medications    Anthropometrics:  Ht Readings from Last 3 Encounters:   11/03/23 5' 2" (1.575 m)   10/31/23 5' 2" (1.575 m)   10/30/23 5' 2" (1.575 m)     Wt Readings from Last 3 Encounters:   11/03/23 87.9 kg (193 lb 12.8 oz)   10/31/23 88.9 kg (196 lb)   10/30/23 88 kg (194 lb 0.1 oz)     Pre-gravid weight: 185 pounds --5/12/23  Pre-gravid BMI: 34.99  Weight Change: Gained 8.75 pounds  Weight gain recommendations: BMI (> 30) 11-20 lbs  Comments: Patient may gain 11 more pounds for the remainder of the pregnancy. Recent Ultra Sound Results:  Date:10/30/23  Fetal Growth:Normal  NELLI: Normal  Next US date: 12/5/23    Blood Glucose Monitoring:  Reinforcement of blood glucose goals and reporting guidelines.      Glucose Meter: OneTouch Verio Flex  Testing blood sugars: 4 x per day (Fasting, 2 hour after start of each meal)  Method of reporting blood sugars:Glucose Flowsheet    Report blood sugar results weekly via:  Phone: [phone]   OR  My Chart (Message with image attachment, or Glucose Flowsheet)    Goal Blood Sugar Ranges:   Fastin-90 mg/dL  1 hour after the start of each meal: 140 mg/dL or less  2 hours after start of each meal: 120 mg/dL or less      BG Log:  Review of blood glucose log. Discussed at Class 2 today      Meal Plan:  Calories: 1800 calorie (CHO: 61-83-03-33-00-55) (PRO:2-1-3-1-3-2)    Diet Type: Low Carbohydrate  Additional Nutrition concerns: Eats nmiostly Turkmen foods. Does dine out sometimes for lunch. Avoids peanut butter  & cereal. Will eat pizza 1 time monthly. 24 hr Diet Recall:  Provided at this classl    Beverages: water & agreed to 4 oz natural juice only if her BG is controlled. Diet review: Eats 3 meals & 3 snacks. Needs to add protein to mid-afternoon snack  & 1 CHO serving to dinner. Has been following the meal plan farily close. o    Diet Instruction:  The patient was instructed on the following:  Individualized meal plan. Importance of consistent carbohydrate intake via 3 meals and 3 snacks per day   Importance of protein as it relates to blood glucose control. Encouraged  patient to eat every 2.0-3.5 hours while awake  Encouraged patient to go no longer than 8-10 hours fasting overnight until first meal of the day. Provided suggested meal/snack options to increase nutrition and maintain consistent meal and snack intakes. Physical Activity:  Discussed benefits of physical activity to optimize blood glucose control, encouraged activity at patient is physically able. Always consult a physician prior to starting an exercise program. Recommend 20-30 minutes daily. Is patient physically active? Yes daily for 20 minutes    Sick day Guidelines:   Patient advised that sickness will raise blood sugar and need to continue medication regimen as directed. If blood sugar is > 160 mg/dL twice in one day call doctor. Instructed on what to do when unable to consume normal meal plan.      Hypoglycemia & Treatment Guidelines:  Reviewed what hypoglycemia is, signs and symptoms, and how to treat via the 15:15 rule. Post-Partum Guidelines:  Completion of 75 gm CHO 2 hr gtt at 6 weeks post-partum to check for Type 2 DM diagnosis    Breastfeeding Guidelines:  Continue GDM meal plan plus additional 350-500 calories daily. Stay hydrated by drinking 8-10 (8 oz.) fluids daily. Examples of protein and carbohydrate snacks provided. Dining Out & Travel Guidelines:  Patient advised to be prepared with extra diabetes supplies, medications, and snacks, as well as sticking to the same time schedule and portions eaten at home for meals and snacks. Maternal-Fetal Testing:    Ultrasounds- growth scans every 4 weeks. NST- twice weekly starting at 32nd week GA   NELLI- weekly starting at 32 weeks GA    Patient Stated Goal: "I will plan my meals and snacks every day"  Goal Assessment: On track    Diabetes Self Management Support Plan outside of ongoing care: Spouse/Family    Learner/s Present:Learners Present: Patient  and Spouse  Barriers to Learning/Change: Cultural, Financial, and Language  Expected Compliance: good    Date to report blood sugars: weekly  Follow up date: As needed    Begin Time: 1:20 PM  End Time: 2:30 PM    It was a pleasure working with them today. Please feel free to call with any questions or concerns.     Roberth Ramos, MS, RD, [address] [facility]  Diabetes Educator  [facility] Maternal Fetal Medicine  Diabetes in Pregnancy Program  [address] [place] [address] 24, [address] [facility], [address] [facility]

## 2023-11-14 PROBLEM — Z3A.34 34 WEEKS GESTATION OF PREGNANCY: Status: ACTIVE | Noted: 2023-06-19

## 2023-11-14 NOTE — ASSESSMENT & PLAN NOTE
Overview:  Labs  Pap smear NILM ; GC/CT neg   Prenatal panel UTI, neg STEWART   28w labs wnl  GBS due at 36 weeks   Vaccines:  Flu vaccine: will receive 10/31  RSV vaccine: will receive 10/31  Tdap vaccine: received 10/2  Genetic screening NIPT wnl, MSAFP wnl  TWG 18 lb  Pre-gravid BMI 30  Recommended weight gain  15-25 lb  Contraception: depo  Feeding plan: breast/bottle together  Birth plan:  with epidural  Delivery consent: signed 10/2/23  Ultrasounds: Level 1  wnl, level II  incomplete, f/u growth 10/30    Assessment and Plan:  No obstetric complaints; but reporting external vaginal itchiness likely 2/2 to shaving given no other associated symptoms.  Return precaution dicussed    Discussed  labor precautions and fetal kick counts  Return to clinic in 1 weeks

## 2023-11-15 ENCOUNTER — DOCUMENTATION (OUTPATIENT)
Dept: PERINATAL CARE | Facility: CLINIC | Age: 28
End: 2023-11-15

## 2023-11-15 NOTE — PROGRESS NOTES
Date: 11/15/23  Doug Pereyra  1995  Estimated Date of Delivery: 12/22/23  34w5d  [facility]    Diagnosis:  Diet controlled GDM    Blood Sugar Logs Submitted via: Glucose Flowsheet        Assessment and Plan:  No diabetes related medications  1800 calorie (CHO: 22-57-62-52-64-29) (PRO:2-1-3-1-3-2) meal plan consisting of 3 meals and 3 snacks daily including protein at each  Advised patient to continue current meal plan  Avoid fasting for > 10 hours overnight  Continue SMBG 4 x per day (Fasting, 2 hour after start of each meal)  Walks regularly    Lab Work:   No results found for: "HGBA1C"     Ultrasound:       Date:10/30/23       Fetal Growth: Normal       NELLI: Normal  Next: 12/5/23    Diabetes Self Management Support Plan outside of ongoing care: [facility]   Patient Stated Goal: "I will plan my meals and snacks every day"   Goal Assessment:  On track    Date to report next: weekly     Asha Gil MS, RD, [address] [facility]  Diabetes Educator  [facility] Maternal Fetal Medicine  Diabetes in Pregnancy Program  [address] [facility] [address] [place] [address], [address] [place] [address]

## 2023-11-16 PROBLEM — Z3A.35 35 WEEKS GESTATION OF PREGNANCY: Status: ACTIVE | Noted: 2023-06-19

## 2023-11-17 ENCOUNTER — ROUTINE PRENATAL (OUTPATIENT)
Dept: OBGYN CLINIC | Facility: CLINIC | Age: 28
End: 2023-11-17

## 2023-11-17 VITALS
HEIGHT: 62 IN | SYSTOLIC BLOOD PRESSURE: 102 MMHG | DIASTOLIC BLOOD PRESSURE: 67 MMHG | HEART RATE: 83 BPM | WEIGHT: 193 LBS | RESPIRATION RATE: 18 BRPM | BODY MASS INDEX: 35.51 KG/M2

## 2023-11-17 DIAGNOSIS — Z3A.35 35 WEEKS GESTATION OF PREGNANCY: Primary | ICD-10-CM

## 2023-11-17 NOTE — PROGRESS NOTES
OB/GYN prenatal visit    S: 34 y.o. Ronni Morse 35w0d here for PN visit. She has no including pelvic pain, contractions, vaginal bleeding, loss of fluid, or decreased fetal movement. Patient is reporting external vaginal itching after shaving. Denies any dysuria, vaginal discharge, hematuria or vaginal odors. O:  Vitals:    11/17/23 1122   BP: 102/67   Pulse: 83   Resp: 18       Prenatal Labs: Blood Type:   Lab Results   Component Value Date/Time    ABO Grouping O 06/14/2023 02:12 PM     , D (Rh type):   Lab Results   Component Value Date/Time    Rh Factor Positive 06/14/2023 02:12 PM   HCT/HGB:   Lab Results   Component Value Date/Time    Hematocrit 38.6 10/13/2023 10:21 AM    Hemoglobin 12.1 10/13/2023 10:21 AM      , MCV:   Lab Results   Component Value Date/Time    MCV 94 10/13/2023 10:21 AM      , Platelets:   Lab Results   Component Value Date/Time    Platelets 360 42/87/7195 10:21 AM      , 1 hour Glucola:   Lab Results   Component Value Date/Time    Glucose 170 (H) 10/13/2023 10:21 AM   , 3 hour GTT:   Lab Results   Component Value Date/Time    Glucose, GTT - 3 Hour 115 10/20/2023 12:05 PM   Rubella:   Lab Results   Component Value Date/Time    Rubella IgG Quant 164.3 06/14/2023 02:12 PM    Hep B:   Lab Results   Component Value Date/Time    Hepatitis B Surface Ag Non-reactive 06/14/2023 02:12 PM       General: AAOX3. No acute distress  Cardiovascular: Regular, Rate and Rhythm, no murmur, gallop or rub   Lungs: Clear to Auscultation Bilaterally, no wheezing, rhonchi or rales    Abdominal: Soft. NT/ND. Gravid  Physical Exam  Exam conducted with a chaperone present. Genitourinary:     General: Normal vulva. Labia:         Right: No tenderness, lesion or injury. Left: Rash (razor bump present) present. No tenderness, lesion or injury. Vagina: Normal. No signs of injury. No vaginal discharge, tenderness or bleeding.      FHT: 144 bpm      Plan discussed with Dr. Donald Cabezas ASSESSMENT/PLAN:    Problem List Items Addressed This Visit       35 weeks gestation of pregnancy - Primary     Overview:  Labs  Pap smear NILM ; GC/CT neg   Prenatal panel UTI, neg STEWART   28w labs wnl  GBS due at 36 weeks   Vaccines:  Flu vaccine: will receive 10/31  RSV vaccine: will receive 10/31  Tdap vaccine: received 10/2  Genetic screening NIPT wnl, MSAFP wnl  TWG 18 lb  Pre-gravid BMI 30  Recommended weight gain  15-25 lb  Contraception: depo  Feeding plan: breast/bottle together  Birth plan:  with epidural  Delivery consent: signed 10/2/23  Ultrasounds: Level 1  wnl, level II 8/723 incomplete, f/u growth 10/30    Assessment and Plan:  No obstetric complaints; but reporting external vaginal itchiness likely 2/2 to shaving given no other associated symptoms.  Return precaution dicussed    Discussed  labor precautions and fetal kick counts  Return to clinic in 1 weeks             [address], MD  2023  11:58 AM  PGY 2 FM

## 2023-11-24 ENCOUNTER — DOCUMENTATION (OUTPATIENT)
Dept: PERINATAL CARE | Facility: CLINIC | Age: 28
End: 2023-11-24

## 2023-11-24 NOTE — PROGRESS NOTES
Date: 11/15/23  Shannon Okeefe  1995  Estimated Date of Delivery: 12/22/23  36w0d  [facility]    Diagnosis:  Diet controlled GDM    Blood Sugar Logs Submitted via: Glucose Flowsheet        Assessment and Plan:  No diabetes related medications  1800 calorie (CHO: 02-76-74-04-85-27) (PRO:2-1-3-1-3-2) meal plan consisting of 3 meals and 3 snacks daily including protein at each  Advised patient to continue current meal plan  Avoid fasting for > 10 hours overnight  Continue SMBG 4 x per day (Fasting, 2 hour after start of each meal)  Walks regularly    Lab Work:   No results found for: "HGBA1C"     Ultrasound:       Date:10/30/23       Fetal Growth: Normal       NELLI: Normal  Next: 12/5/23    Diabetes Self Management Support Plan outside of ongoing care: [facility]   Patient Stated Goal: "I will plan my meals and snacks every day"   Goal Assessment:  On track    Date to report next: weekly     Mary Bustos MS, RD, [address] [facility]  Diabetes Educator  [facility] Maternal Fetal Medicine  Diabetes in Pregnancy Program  [address] [facility], [address] [place] [address]

## 2023-12-01 ENCOUNTER — DOCUMENTATION (OUTPATIENT)
Dept: PERINATAL CARE | Facility: CLINIC | Age: 28
End: 2023-12-01

## 2023-12-01 NOTE — PROGRESS NOTES
Date: 12/01/23  Corby Mis  1995  Estimated Date of Delivery: 12/22/23  37w0d  [facility]    Diagnosis:  Diet controlled GDM    Blood Sugar Logs Submitted via: Glucose Flowsheet        Assessment and Plan:  No diabetes related medications  1800 calorie (CHO: 95-71-49-16-13-94) (PRO:2-1-3-1-3-2) meal plan consisting of 3 meals and 3 snacks daily including protein at each  Advised patient to continue current meal plan  Avoid fasting for > 10 hours overnight  Continue SMBG 4 x per day (Fasting, 2 hour after start of each meal)  Walks regularly    Lab Work:   No results found for: "HGBA1C"     Ultrasound:       Date:10/30/23       Fetal Growth: Normal       NELLI: Normal  Next: 12/5/23    Diabetes Self Management Support Plan outside of ongoing care: [facility]   Patient Stated Goal: "I will plan my meals and snacks every day"   Goal Assessment:  On track    Date to report next: weekly     Anil Cuadra, MS, RD, [address] [facility]  Diabetes Educator  [facility] Maternal Fetal Medicine  Diabetes in Pregnancy Program  [address] [facility] 06, [address]

## 2023-12-05 ENCOUNTER — ULTRASOUND (OUTPATIENT)
Facility: HOSPITAL | Age: 28
End: 2023-12-05

## 2023-12-05 VITALS
HEART RATE: 94 BPM | SYSTOLIC BLOOD PRESSURE: 102 MMHG | WEIGHT: 200 LBS | HEIGHT: 62 IN | DIASTOLIC BLOOD PRESSURE: 56 MMHG | BODY MASS INDEX: 36.8 KG/M2

## 2023-12-05 DIAGNOSIS — Z36.89 ENCOUNTER FOR ULTRASOUND TO ASSESS FETAL GROWTH: ICD-10-CM

## 2023-12-05 DIAGNOSIS — Z3A.37 37 WEEKS GESTATION OF PREGNANCY: Primary | ICD-10-CM

## 2023-12-05 DIAGNOSIS — O24.410 DIET CONTROLLED GESTATIONAL DIABETES MELLITUS (GDM) IN THIRD TRIMESTER: ICD-10-CM

## 2023-12-05 PROBLEM — O99.810 ABNORMAL GLUCOSE TOLERANCE IN PREGNANCY: Status: RESOLVED | Noted: 2023-10-13 | Resolved: 2023-12-05

## 2023-12-05 PROCEDURE — 76816 OB US FOLLOW-UP PER FETUS: CPT | Performed by: OBSTETRICS & GYNECOLOGY

## 2023-12-05 PROCEDURE — 99213 OFFICE O/P EST LOW 20 MIN: CPT | Performed by: OBSTETRICS & GYNECOLOGY

## 2023-12-05 NOTE — LETTER
December 5, 2023     Thierry Fried, [address]    Patient: Todd Yip   YOB: 1995   Date of Visit: 12/5/2023       Dear Russ Callahan: Thank you for referring Todd Yip to me for evaluation. Below are my notes for this consultation. If you have questions, please do not hesitate to call me. I look forward to following your patient along with you. Sincerely,        Cherry Pace MD        CC: No Recipients    Cherry Pace MD  12/5/2023  2:46 PM  Sign when Signing Visit  [address] [facility] [address]: Todd Yip was seen today at 37w4d for fetal growth assessment ultrasound. See ultrasound report under "OB Procedures" tab.   Please don't hesitate to contact our office with any concerns or questions.  -Cherry Pace MD

## 2023-12-05 NOTE — PROGRESS NOTES
[address] [facility] [address]: Pam Moreno was seen today at 37w4d for fetal growth assessment ultrasound. See ultrasound report under "OB Procedures" tab.   Please don't hesitate to contact our office with any concerns or questions.  -Juan Serra MD

## 2023-12-07 ENCOUNTER — ROUTINE PRENATAL (OUTPATIENT)
Dept: OBGYN CLINIC | Facility: CLINIC | Age: 28
End: 2023-12-07

## 2023-12-07 VITALS
WEIGHT: 198.8 LBS | SYSTOLIC BLOOD PRESSURE: 110 MMHG | HEART RATE: 78 BPM | DIASTOLIC BLOOD PRESSURE: 71 MMHG | HEIGHT: 62 IN | BODY MASS INDEX: 36.58 KG/M2

## 2023-12-07 DIAGNOSIS — Z3A.37 37 WEEKS GESTATION OF PREGNANCY: Primary | ICD-10-CM

## 2023-12-07 DIAGNOSIS — Z34.93 PRENATAL CARE IN THIRD TRIMESTER: ICD-10-CM

## 2023-12-07 PROCEDURE — 99213 OFFICE O/P EST LOW 20 MIN: CPT | Performed by: NURSE PRACTITIONER

## 2023-12-07 PROCEDURE — 87150 DNA/RNA AMPLIFIED PROBE: CPT | Performed by: NURSE PRACTITIONER

## 2023-12-07 NOTE — PROGRESS NOTES
Levy Riley presents today for routine OB visit at 37w6d. She is a  with a history of one full term . Pregnancy is complicated by diet controlled GDM, sugars have been well controlled. Blood Pressure: 110/71  Wt=90.2 kg (198 lb 12.8 oz); Body mass index is 36.36 kg/m².; TWG=9.075 kg (20 lb 0.1 oz)  Fetal Heart Rate: 145; Fundal Height (cm): 37 cm  Abdomen: gravid, soft, non-tender. She reports no complaints or concerns. Denies uterine contractions. Denies vaginal bleeding or leaking of fluid. Reports adequate fetal movement of at least 10 movements in 2 hours once daily. GBS culture collected. Reviewed labor precautions and fetal kick counts as well as pre-eclampsia warning signs. Reviewed perineal massage for decreasing risk of perineal lacerations during delivery. Advised to continue medications and return in 1 week. Current Outpatient Medications   Medication Instructions    Blood Glucose Monitoring Suppl (OneTouch Verio Reflect) w/Device KIT Dispense 1 kit per insurance formulary. GDM    glucose blood (OneTouch Verio) test strip Test 4 times a day. GDM    OneTouch Delica Lancets 73B MISC Use 4 a day.  GDM    Prenatal Vit-DSS-Fe Fum-FA (Prenatal 19) tablet 1 tablet, Oral, Daily         Pregnancy Problems (from 23 to present)       Problem Noted Resolved    Diet controlled gestational diabetes mellitus (GDM) in third trimester 10/30/2023 by Bret Mcadams MD No    37 weeks gestation of pregnancy 2023 by Doris Don MD No    Overview Addendum 2023  2:18 PM by ANANDA Saravia     Overview:  Labs  Pap smear NILM ; GC/CT neg   Prenatal panel UTI, neg STEWART   28w labs wnl  GBS culture collected 23  Vaccines:  Flu vaccine: will receive 10/31  RSV vaccine: will receive 10/31  Tdap vaccine: received 10/2  Genetic screening NIPT wnl, MSAFP wnl  TWG 18 lb  Pre-gravid BMI 30  Recommended weight gain  15-25 lb  Contraception: depo  Feeding plan: breast/bottle together  Birth plan:  with epidural  Delivery consent: signed 10/2/23  Ultrasounds: Level 1  wnl, level II  incomplete, f/u growth 10/30 wnl    Assessment and Plan:  No obstetric complaints  Discussed  labor precautions and fetal kick counts  Return to clinic in 1 weeks

## 2023-12-08 ENCOUNTER — DOCUMENTATION (OUTPATIENT)
Dept: PERINATAL CARE | Facility: CLINIC | Age: 28
End: 2023-12-08

## 2023-12-08 NOTE — PROGRESS NOTES
Date: 12/01/23  Scar Ala  1995  Estimated Date of Delivery: 12/22/23  38w0d  [facility]    Diagnosis:  Diet controlled GDM    Blood Sugar Logs Submitted via: Glucose Flowsheet        Assessment and Plan:  No diabetes related medications  1800 calorie (CHO: 28-88-28-50-61-21) (PRO:2-1-3-1-3-2) meal plan consisting of 3 meals and 3 snacks daily including protein at each  Advised patient to continue current meal plan  Avoid fasting for > 10 hours overnight  Continue SMBG 4 x per day (Fasting, 2 hour after start of each meal)  Walks regularly    Lab Work:   No results found for: "HGBA1C"     Ultrasound:       Date:12/5/23       Fetal Growth: Normal       NELLI: Normal  Next: none    Diabetes Self Management Support Plan outside of ongoing care: [facility]   Patient Stated Goal: "I will plan my meals and snacks every day"   Goal Assessment:  On track    Date to report next: weekly     Kaitlin Livingston, MS, RD, [address] [facility]  Diabetes Educator  [facility] Maternal Fetal Medicine  Diabetes in Pregnancy Program  [address] [facility], [address] [place] [address]

## 2023-12-09 LAB — GP B STREP DNA SPEC QL NAA+PROBE: NEGATIVE

## 2023-12-12 ENCOUNTER — HOSPITAL ENCOUNTER (INPATIENT)
Facility: HOSPITAL | Age: 28
LOS: 2 days | Discharge: HOME | End: 2023-12-14
Attending: STUDENT IN AN ORGANIZED HEALTH CARE EDUCATION/TRAINING PROGRAM | Admitting: STUDENT IN AN ORGANIZED HEALTH CARE EDUCATION/TRAINING PROGRAM
Payer: COMMERCIAL

## 2023-12-12 DIAGNOSIS — Z3A.38 38 WEEKS GESTATION OF PREGNANCY: ICD-10-CM

## 2023-12-12 PROBLEM — O42.90 PROM (PREMATURE RUPTURE OF MEMBRANES): Status: ACTIVE | Noted: 2023-12-12

## 2023-12-12 LAB
ABO GROUP BLD: NORMAL
BLD GP AB SCN SERPL QL: NEGATIVE
ERYTHROCYTE [DISTWIDTH] IN BLOOD BY AUTOMATED COUNT: 14.5 % (ref 11.6–15.1)
GLUCOSE SERPL-MCNC: 64 MG/DL (ref 65–140)
GLUCOSE SERPL-MCNC: 72 MG/DL (ref 65–140)
GLUCOSE SERPL-MCNC: 73 MG/DL (ref 65–140)
GLUCOSE SERPL-MCNC: 92 MG/DL (ref 65–140)
HCT VFR BLD AUTO: 39.6 % (ref 34.8–46.1)
HGB BLD-MCNC: 12.6 G/DL (ref 11.5–15.4)
HOLD SPECIMEN: NORMAL
MCH RBC QN AUTO: 28.8 PG (ref 26.8–34.3)
MCHC RBC AUTO-ENTMCNC: 31.8 G/DL (ref 31.4–37.4)
MCV RBC AUTO: 90 FL (ref 82–98)
PLATELET # BLD AUTO: 256 THOUSANDS/UL (ref 149–390)
PMV BLD AUTO: 9.8 FL (ref 8.9–12.7)
RBC # BLD AUTO: 4.38 MILLION/UL (ref 3.81–5.12)
RH BLD: POSITIVE
SPECIMEN EXPIRATION DATE: NORMAL
WBC # BLD AUTO: 7.74 THOUSAND/UL (ref 4.31–10.16)

## 2023-12-12 PROCEDURE — 85027 COMPLETE CBC AUTOMATED: CPT | Performed by: OBSTETRICS & GYNECOLOGY

## 2023-12-12 PROCEDURE — NC001 PR NO CHARGE: Performed by: OBSTETRICS & GYNECOLOGY

## 2023-12-12 PROCEDURE — 86901 BLOOD TYPING SEROLOGIC RH(D): CPT | Performed by: OBSTETRICS & GYNECOLOGY

## 2023-12-12 PROCEDURE — 82948 REAGENT STRIP/BLOOD GLUCOSE: CPT

## 2023-12-12 PROCEDURE — 86900 BLOOD TYPING SEROLOGIC ABO: CPT | Performed by: OBSTETRICS & GYNECOLOGY

## 2023-12-12 PROCEDURE — 86850 RBC ANTIBODY SCREEN: CPT | Performed by: OBSTETRICS & GYNECOLOGY

## 2023-12-12 PROCEDURE — 86780 TREPONEMA PALLIDUM: CPT | Performed by: OBSTETRICS & GYNECOLOGY

## 2023-12-12 PROCEDURE — 99214 OFFICE O/P EST MOD 30 MIN: CPT

## 2023-12-12 RX ORDER — BUPIVACAINE HYDROCHLORIDE 2.5 MG/ML
30 INJECTION, SOLUTION EPIDURAL; INFILTRATION; INTRACAUDAL ONCE AS NEEDED
Status: DISCONTINUED | OUTPATIENT
Start: 2023-12-12 | End: 2023-12-13

## 2023-12-12 RX ORDER — OXYTOCIN/RINGER'S LACTATE 30/500 ML
1-30 PLASTIC BAG, INJECTION (ML) INTRAVENOUS
Status: DISCONTINUED | OUTPATIENT
Start: 2023-12-12 | End: 2023-12-13

## 2023-12-12 RX ORDER — ONDANSETRON 2 MG/ML
4 INJECTION INTRAMUSCULAR; INTRAVENOUS EVERY 6 HOURS PRN
Status: DISCONTINUED | OUTPATIENT
Start: 2023-12-12 | End: 2023-12-13

## 2023-12-12 RX ORDER — SODIUM CHLORIDE 9 MG/ML
125 INJECTION, SOLUTION INTRAVENOUS CONTINUOUS
Status: DISCONTINUED | OUTPATIENT
Start: 2023-12-12 | End: 2023-12-13

## 2023-12-12 RX ADMIN — Medication 2 MILLI-UNITS/MIN: at 19:42

## 2023-12-12 RX ADMIN — SODIUM CHLORIDE 125 ML/HR: 0.9 INJECTION, SOLUTION INTRAVENOUS at 18:56

## 2023-12-12 RX ADMIN — SODIUM CHLORIDE 125 ML/HR: 0.9 INJECTION, SOLUTION INTRAVENOUS at 23:26

## 2023-12-13 ENCOUNTER — ANESTHESIA EVENT (INPATIENT)
Dept: ANESTHESIOLOGY | Facility: HOSPITAL | Age: 28
End: 2023-12-13
Payer: COMMERCIAL

## 2023-12-13 ENCOUNTER — ANESTHESIA (INPATIENT)
Dept: ANESTHESIOLOGY | Facility: HOSPITAL | Age: 28
End: 2023-12-13
Payer: COMMERCIAL

## 2023-12-13 PROBLEM — Z3A.38 38 WEEKS GESTATION OF PREGNANCY: Status: ACTIVE | Noted: 2023-06-19

## 2023-12-13 LAB
BASE EXCESS BLDCOA CALC-SCNC: -7.5 MMOL/L (ref 3–11)
BASE EXCESS BLDCOV CALC-SCNC: -4.5 MMOL/L (ref 1–9)
GLUCOSE SERPL-MCNC: 63 MG/DL (ref 65–140)
GLUCOSE SERPL-MCNC: 68 MG/DL (ref 65–140)
HCO3 BLDCOA-SCNC: 20 MMOL/L (ref 17.3–27.3)
HCO3 BLDCOV-SCNC: 20 MMOL/L (ref 12.2–28.6)
OXYHGB MFR BLDCOA: 12.8 %
OXYHGB MFR BLDCOV: 68.2 %
PCO2 BLDCOA: 47.2 MM[HG] (ref 30–60)
PCO2 BLDCOV: 35.6 MM HG (ref 27–43)
PH BLDCOA: 7.24 [PH] (ref 7.23–7.43)
PH BLDCOV: 7.37 [PH] (ref 7.19–7.49)
PO2 BLDCOA: <10 MM HG (ref 5–25)
PO2 BLDCOV: 27.9 MM HG (ref 15–45)
SAO2 % BLDCOV: 15.7 ML/DL
TREPONEMA PALLIDUM IGG+IGM AB [PRESENCE] IN SERUM OR PLASMA BY IMMUNOASSAY: NORMAL

## 2023-12-13 PROCEDURE — 59409 OBSTETRICAL CARE: CPT | Performed by: OBSTETRICS & GYNECOLOGY

## 2023-12-13 PROCEDURE — 82948 REAGENT STRIP/BLOOD GLUCOSE: CPT

## 2023-12-13 PROCEDURE — 0KQM0ZZ REPAIR PERINEUM MUSCLE, OPEN APPROACH: ICD-10-PCS | Performed by: OBSTETRICS & GYNECOLOGY

## 2023-12-13 PROCEDURE — 4A1HXCZ MONITORING OF PRODUCTS OF CONCEPTION, CARDIAC RATE, EXTERNAL APPROACH: ICD-10-PCS | Performed by: OBSTETRICS & GYNECOLOGY

## 2023-12-13 PROCEDURE — 82805 BLOOD GASES W/O2 SATURATION: CPT | Performed by: STUDENT IN AN ORGANIZED HEALTH CARE EDUCATION/TRAINING PROGRAM

## 2023-12-13 RX ORDER — METHYLERGONOVINE MALEATE 0.2 MG/ML
INJECTION INTRAVENOUS
Status: COMPLETED
Start: 2023-12-13 | End: 2023-12-13

## 2023-12-13 RX ORDER — DOCUSATE SODIUM 100 MG/1
100 CAPSULE, LIQUID FILLED ORAL 2 TIMES DAILY
Status: DISCONTINUED | OUTPATIENT
Start: 2023-12-13 | End: 2023-12-14 | Stop reason: HOSPADM

## 2023-12-13 RX ORDER — SODIUM CHLORIDE, SODIUM LACTATE, POTASSIUM CHLORIDE, CALCIUM CHLORIDE 600; 310; 30; 20 MG/100ML; MG/100ML; MG/100ML; MG/100ML
100 INJECTION, SOLUTION INTRAVENOUS CONTINUOUS
Status: DISCONTINUED | OUTPATIENT
Start: 2023-12-13 | End: 2023-12-13

## 2023-12-13 RX ORDER — ONDANSETRON 2 MG/ML
4 INJECTION INTRAMUSCULAR; INTRAVENOUS EVERY 8 HOURS PRN
Status: DISCONTINUED | OUTPATIENT
Start: 2023-12-13 | End: 2023-12-14 | Stop reason: HOSPADM

## 2023-12-13 RX ORDER — CALCIUM CARBONATE 500 MG/1
1000 TABLET, CHEWABLE ORAL DAILY PRN
Status: DISCONTINUED | OUTPATIENT
Start: 2023-12-13 | End: 2023-12-14 | Stop reason: HOSPADM

## 2023-12-13 RX ORDER — DIPHENHYDRAMINE HYDROCHLORIDE 50 MG/ML
25 INJECTION INTRAMUSCULAR; INTRAVENOUS EVERY 6 HOURS PRN
Status: DISCONTINUED | OUTPATIENT
Start: 2023-12-13 | End: 2023-12-14 | Stop reason: HOSPADM

## 2023-12-13 RX ORDER — LIDOCAINE HYDROCHLORIDE AND EPINEPHRINE 15; 5 MG/ML; UG/ML
INJECTION, SOLUTION EPIDURAL
Status: COMPLETED | OUTPATIENT
Start: 2023-12-13 | End: 2023-12-13

## 2023-12-13 RX ORDER — BENZOCAINE/MENTHOL 6 MG-10 MG
1 LOZENGE MUCOUS MEMBRANE DAILY PRN
Status: DISCONTINUED | OUTPATIENT
Start: 2023-12-13 | End: 2023-12-14 | Stop reason: HOSPADM

## 2023-12-13 RX ORDER — IBUPROFEN 600 MG/1
600 TABLET ORAL EVERY 6 HOURS PRN
Status: DISCONTINUED | OUTPATIENT
Start: 2023-12-13 | End: 2023-12-14 | Stop reason: HOSPADM

## 2023-12-13 RX ORDER — SENNOSIDES 8.6 MG
1 TABLET ORAL DAILY
Status: DISCONTINUED | OUTPATIENT
Start: 2023-12-13 | End: 2023-12-14 | Stop reason: HOSPADM

## 2023-12-13 RX ORDER — ACETAMINOPHEN 325 MG/1
650 TABLET ORAL EVERY 6 HOURS PRN
Status: DISCONTINUED | OUTPATIENT
Start: 2023-12-13 | End: 2023-12-14 | Stop reason: HOSPADM

## 2023-12-13 RX ORDER — OXYTOCIN/RINGER'S LACTATE 30/500 ML
250 PLASTIC BAG, INJECTION (ML) INTRAVENOUS ONCE
Status: COMPLETED | OUTPATIENT
Start: 2023-12-13 | End: 2023-12-13

## 2023-12-13 RX ORDER — METHYLERGONOVINE MALEATE 0.2 MG/ML
0.2 INJECTION INTRAVENOUS ONCE
Status: COMPLETED | OUTPATIENT
Start: 2023-12-13 | End: 2023-12-13

## 2023-12-13 RX ORDER — SIMETHICONE 80 MG
80 TABLET,CHEWABLE ORAL 4 TIMES DAILY PRN
Status: DISCONTINUED | OUTPATIENT
Start: 2023-12-13 | End: 2023-12-14 | Stop reason: HOSPADM

## 2023-12-13 RX ADMIN — WITCH HAZEL 1 PAD: 500 SOLUTION RECTAL; TOPICAL at 11:15

## 2023-12-13 RX ADMIN — SODIUM CHLORIDE 125 ML/HR: 0.9 INJECTION, SOLUTION INTRAVENOUS at 02:22

## 2023-12-13 RX ADMIN — SODIUM CHLORIDE 125 ML/HR: 0.9 INJECTION, SOLUTION INTRAVENOUS at 06:40

## 2023-12-13 RX ADMIN — METHYLERGONOVINE MALEATE 0.2 MG: 0.2 INJECTION INTRAVENOUS at 08:50

## 2023-12-13 RX ADMIN — SODIUM CHLORIDE, SODIUM LACTATE, POTASSIUM CHLORIDE, AND CALCIUM CHLORIDE 100 ML: .6; .31; .03; .02 INJECTION, SOLUTION INTRAVENOUS at 02:07

## 2023-12-13 RX ADMIN — IBUPROFEN 600 MG: 600 TABLET, FILM COATED ORAL at 12:36

## 2023-12-13 RX ADMIN — SODIUM CHLORIDE, SODIUM LACTATE, POTASSIUM CHLORIDE, AND CALCIUM CHLORIDE 200 ML: .6; .31; .03; .02 INJECTION, SOLUTION INTRAVENOUS at 01:05

## 2023-12-13 RX ADMIN — LIDOCAINE HYDROCHLORIDE AND EPINEPHRINE 5 ML: 15; 5 INJECTION, SOLUTION EPIDURAL at 01:56

## 2023-12-13 RX ADMIN — IBUPROFEN 600 MG: 600 TABLET, FILM COATED ORAL at 22:49

## 2023-12-13 RX ADMIN — BENZOCAINE AND LEVOMENTHOL 1 APPLICATION: 200; 5 SPRAY TOPICAL at 11:16

## 2023-12-13 RX ADMIN — Medication 250 MILLI-UNITS/MIN: at 08:41

## 2023-12-13 RX ADMIN — ACETAMINOPHEN 650 MG: 325 TABLET, FILM COATED ORAL at 07:42

## 2023-12-13 RX ADMIN — Medication 250 MILLI-UNITS/MIN: at 08:06

## 2023-12-13 RX ADMIN — ROPIVACAINE HYDROCHLORIDE: 2 INJECTION, SOLUTION EPIDURAL; INFILTRATION at 02:00

## 2023-12-13 RX ADMIN — ACETAMINOPHEN 650 MG: 325 TABLET, FILM COATED ORAL at 16:57

## 2023-12-13 NOTE — PLAN OF CARE
Problem: PAIN - ADULT  Goal: Verbalizes/displays adequate comfort level or baseline comfort level  Description: Interventions:  - Encourage patient to monitor pain and request assistance  - Assess pain using appropriate pain scale  - Administer analgesics based on type and severity of pain and evaluate response  - Implement non-pharmacological measures as appropriate and evaluate response  - Consider cultural and social influences on pain and pain management  - Notify physician/advanced practitioner if interventions unsuccessful or patient reports new pain  Outcome: Progressing     Problem: INFECTION - ADULT  Goal: Absence or prevention of progression during hospitalization  Description: INTERVENTIONS:  - Assess and monitor for signs and symptoms of infection  - Monitor lab/diagnostic results  - Monitor all insertion sites, i.e. indwelling lines, tubes, and drains  - Monitor endotracheal if appropriate and nasal secretions for changes in amount and color  - Castella appropriate cooling/warming therapies per order  - Administer medications as ordered  - Instruct and encourage patient and family to use good hand hygiene technique  - Identify and instruct in appropriate isolation precautions for identified infection/condition  Outcome: Progressing  Goal: Absence of fever/infection during neutropenic period  Description: INTERVENTIONS:  - Monitor WBC    Outcome: Progressing     Problem: SAFETY ADULT  Goal: Patient will remain free of falls  Description: INTERVENTIONS:  - Educate patient/family on patient safety including physical limitations  - Instruct patient to call for assistance with activity   - Consult OT/PT to assist with strengthening/mobility   - Keep Call bell within reach  - Keep bed low and locked with side rails adjusted as appropriate  - Keep care items and personal belongings within reach  - Initiate and maintain comfort rounds  - Make Fall Risk Sign visible to staff  - Offer Toileting every 2 Hours, in advance of need  - Initiate/Maintain bed alarm  - Obtain necessary fall risk management equipment: no-slip socks  - Apply yellow socks and bracelet for high fall risk patients  - Consider moving patient to room near nurses station  Outcome: Progressing  Goal: Maintain or return to baseline ADL function  Description: INTERVENTIONS:  -  Assess patient's ability to carry out ADLs; assess patient's baseline for ADL function and identify physical deficits which impact ability to perform ADLs (bathing, care of mouth/teeth, toileting, grooming, dressing, etc.)  - Assess/evaluate cause of self-care deficits   - Assess range of motion  - Assess patient's mobility; develop plan if impaired  - Assess patient's need for assistive devices and provide as appropriate  - Encourage maximum independence but intervene and supervise when necessary  - Involve family in performance of ADLs  - Assess for home care needs following discharge   - Consider OT consult to assist with ADL evaluation and planning for discharge  - Provide patient education as appropriate  Outcome: Progressing  Goal: Maintains/Returns to pre admission functional level  Description: INTERVENTIONS:  - Perform AM-PAC 6 Click Basic Mobility/ Daily Activity assessment daily.  - Set and communicate daily mobility goal to care team and patient/family/caregiver. - Collaborate with rehabilitation services on mobility goals if consulted  - Perform Range of Motion PRN times a day. - Reposition patient every PRN hours.   - Dangle patient PRN times a day  - Stand patient PRN times a day  - Ambulate patient PRN times a day  - Out of bed to chair PRN times a day   - Out of bed for meals PRN times a day  - Out of bed for toileting  - Record patient progress and toleration of activity level   Outcome: Progressing     Problem: Knowledge Deficit  Goal: Patient/family/caregiver demonstrates understanding of disease process, treatment plan, medications, and discharge instructions  Description: Complete learning assessment and assess knowledge base.   Interventions:  - Provide teaching at level of understanding  - Provide teaching via preferred learning methods  Outcome: Progressing     Problem: DISCHARGE PLANNING  Goal: Discharge to home or other facility with appropriate resources  Description: INTERVENTIONS:  - Identify barriers to discharge w/patient and caregiver  - Arrange for needed discharge resources and transportation as appropriate  - Identify discharge learning needs (meds, wound care, etc.)  - Arrange for interpretive services to assist at discharge as needed  - Refer to Case Management Department for coordinating discharge planning if the patient needs post-hospital services based on physician/advanced practitioner order or complex needs related to functional status, cognitive ability, or social support system  Outcome: Progressing     Problem: BIRTH - VAGINAL/ SECTION  Goal: Fetal and maternal status remain reassuring during the birth process  Description: INTERVENTIONS:  - Monitor vital signs  - Monitor fetal heart rate  - Monitor uterine activity  - Monitor labor progression (vaginal delivery)  - DVT prophylaxis  - Antibiotic prophylaxis  Outcome: Progressing  Goal: Emotionally satisfying birthing experience for mother/fetus  Description: Interventions:  - Assess, plan, implement and evaluate the nursing care given to the patient in labor  - Advocate the philosophy that each childbirth experience is a unique experience and support the family's chosen level of involvement and control during the labor process   - Actively participate in both the patient's and family's teaching of the birth process  - Consider cultural, Hinduism and age-specific factors and plan care for the patient in labor  Outcome: Progressing

## 2023-12-13 NOTE — OB LABOR/OXYTOCIN SAFETY PROGRESS
Labor Progress Note - Armida Head 34 y.o. female MRN: [id number]    Unit/Bed#: -01 Encounter: [id number]    Dose (denys-units/min) Oxytocin: 0 denys-units/min  Contraction Frequency (minutes): 2.5-4  Contraction Intensity: Mild  Uterine Activity Characteristics: Regular  Cervical Dilation: 6-7        Cervical Effacement: 70  Fetal Station: -1  Baseline Rate (FHR): 130 bpm  Fetal Heart Rate (FHT): 146 BPM  FHR Category: 2               Vital Signs:   Vitals:    12/13/23 0218   BP: 109/57   Pulse: 65   Resp:    Temp:    SpO2:        Notes/comments:   SVE as above. Patient comfortable with epidural. Category II tracing due to recurrent variables. Pitocin is still off and amnioinfusion is running. Repositioning patient now. Dr. Isabel Gonzales aware.       Nicole Felix MD 12/13/2023 3:28 AM

## 2023-12-13 NOTE — ANESTHESIA PROCEDURE NOTES
Epidural Block    Patient location during procedure: OB/L&D  Start time: 12/13/2023 1:55 AM  Reason for block: procedure for pain  Staffing  Performed by: Maye Bello CRNA  Authorized by: Lottie Gibbons MD    Preanesthetic Checklist  Completed: patient identified, IV checked, site marked, risks and benefits discussed, surgical consent, monitors and equipment checked, pre-op evaluation and timeout performed  Epidural  Patient position: sitting  Prep: ChloraPrep  Sedation Level: no sedation  Patient monitoring: frequent blood pressure checks, continuous pulse oximetry and heart rate  Approach: midline  Location: lumbar, L3-4  Injection technique: LUCIANO saline  Needle  Needle type: Tuohy   Needle gauge: 18 G  Needle insertion depth: 6 cm  Catheter type: multi-orifice  Catheter size: 20 G  Catheter at skin depth: 12 cm  Catheter securement method: stabilization device and clear occlusive dressing  Test dose: negativelidocaine-epinephrine (XYLOCAINE-MPF/EPINEPHRINE) 1.5 %-1:200,000 injection 3 mL - Epidural   5 mL - 12/13/2023 1:56:00 AM  Assessment  Sensory level: T10  Number of attempts: 2negative aspiration for CSF, negative aspiration for heme and no paresthesia on injection  patient tolerated the procedure well with no immediate complications

## 2023-12-13 NOTE — L&D DELIVERY NOTE
DELIVERY NOTE  Pam Moreno 34 y.o. female MRN: [id number]  Unit/Bed#: -01 Encounter: [id number]    Obstetrician:   Dr Denise Mai     Assistant: Mariana SANTOSII    Pre-Delivery Diagnosis: Term pregnancy  Single fetus  SROM  A1 Gestational DM    Post-Delivery Diagnosis: Same as above - Delivered  Viable male fetus  2nd degree laceration  Periurethral tear    Procedure: Spontaneous vaginal delivery  Repair none, 2nd degree, and periurethral spontaneous laceration      QBL  262 ml           Complications:  None    Gases: Arterial PH 7.24 and base excess- 7.5 and venous 7.36 and base excess -4.5      Description of Delivery: Patient delivered a viable Male  over intact perineum and/or none and 2nd degree laceration and left periurethral tear. A nuchal cord was noted x1 that was easily reduced. . With the assistance of maternal expulsive efforts and downward traction of fetal head, the anterior shoulder was delivered without difficulty, followed by the remainder of the infant's body. After delivery of the , after delayed cord clamping the umbilical cord was doubly clamped and cut and the  was passed off to  staff for routine care. Umbilical artery and venous gases were collected followed by umbilical cord blood. The placenta was then delivered with fundal massage and gentle cord traction while active management of the third stage of labor was accomplished with IV oxytocin. The placenta was inspected and found to be intact with a 3-vessel cord. Uterine bleeding was noted to be under control. Inspection of the perineum, vagina, labia, and urethra revealed none, 2nd degree, and periurethral laceration/s which were/was then repaired in standard fashion with 3-0 Vicryl. The lacerations were inspected and found to have good tissue reapproximation and hemostasis. Mother and baby are currently recovering nicely in stable condition.            Attending Attestation: I was present for the entire procedure and a qualified resident physician was not available.      Anusha Barry MD  12/13/2023  9:48 AM

## 2023-12-13 NOTE — OB LABOR/OXYTOCIN SAFETY PROGRESS
Labor Progress Note - Aaron Arroyo 34 y.o. female MRN: [id number]    Unit/Bed#: -01 Encounter: [id number]    Dose (denys-units/min) Oxytocin: 2 denys-units/min (per Dr. Irvin Gutierrez)  Contraction Frequency (minutes): 2-3  Contraction Intensity: Mild/Moderate  Uterine Activity Characteristics: Regular  Cervical Dilation: 5        Cervical Effacement: 70  Fetal Station: -1  Baseline Rate (FHR): 130 bpm  Fetal Heart Rate (FHT): 145 BPM  FHR Category: cat ii               Vital Signs:   Vitals:    12/12/23 2356   BP: 126/64   Pulse: 76   Resp:    Temp:    SpO2:        Notes/comments:   Repositioned, stopped pitocin which allowed for resolution of variable with return to baseline with moderate variability  Given recurrent variables, placed IUPC and planned for Norman Perkins MD 12/13/2023 12:59 AM

## 2023-12-13 NOTE — ANESTHESIA POSTPROCEDURE EVALUATION
Post-Op Assessment Note    CV Status:  Stable  Pain Score: 0    Pain management: adequate      Post-op block assessment: site cleaned, catheter intact and no complications   Mental Status:  Alert and awake   Hydration Status:  Euvolemic   PONV Controlled:  Controlled   Airway Patency:  Patent     Post Op Vitals Reviewed: Yes      Staff: CRNA           BP      Temp      Pulse     Resp      SpO2

## 2023-12-13 NOTE — OB LABOR/OXYTOCIN SAFETY PROGRESS
Labor Progress Note - Chi Jimenez 34 y.o. female MRN: [id number]    Unit/Bed#: -01 Encounter: [id number]                Cervical Dilation: 3        Cervical Effacement: 48  Fetal Station: -2     Fetal Heart Rate (FHT): 138 BPM  FHR Category: 1               Vital Signs:   Vitals:    12/12/23 1900   BP: 116/72   Pulse: 64   Resp: 16   Temp: 98.4 °F (36.9 °C)   SpO2:        Notes/comments:   SVE unchanged. Category I tracing with no contractions seen on toco. Will start pitocin. D/w Dr. Zaid Lopez.       Ghazala Qureshi MD 12/12/2023 7:09 PM

## 2023-12-13 NOTE — ANESTHESIA PREPROCEDURE EVALUATION
Procedure:  LABOR ANALGESIA    Relevant Problems   GYN   (+) 37 weeks gestation of pregnancy      Endocrine   (+) Diet controlled gestational diabetes mellitus (GDM) in third trimester      Other   (+) PROM (premature rupture of membranes)      Lab Results   Component Value Date    WBC 7.74 12/12/2023    HGB 12.6 12/12/2023    HCT 39.6 12/12/2023    MCV 90 12/12/2023     12/12/2023         Physical Exam    Airway       Dental       Cardiovascular      Pulmonary      Other Findings  post-pubertal.      Anesthesia Plan  ASA Score- 2     Anesthesia Type- epidural with ASA Monitors. Additional Monitors:     Airway Plan:            Plan Factors-Exercise tolerance (METS): >4 METS. Induction-     Postoperative Plan-     Informed Consent- Anesthetic plan and risks discussed with patient and spouse. I personally reviewed this patient with the CRNA. Discussed and agreed on the Anesthesia Plan with the CRNA. Marifer Fox

## 2023-12-13 NOTE — DISCHARGE SUMMARY
Obstetrics Discharge Summary  Corby Colunga 34 y.o. female MRN: [id number]  Unit/Bed#: -01 Encounter: [id number]    Admission Date: 2023     Discharge Date: ***    Admitting and Delivery Attending: Lenore Simons MD  Discharging Attending: Simone Munson MD    Admitting Diagnoses:   Pregnancy at 38w4d  A1GDM  SROM    Discharge Diagnoses:   Term delivery    Procedures: spontaneous vaginal delivery with repair of second degree perineal laceration    Anesthesia: epidural    Hospital course: Corby Colunga is now a 34 y.o. [id number] who was initially admitted at 38w4d after SROM. Labor was augmented with Pitocin and she received an epidural. She progressed to complete cervical dilation and began pushing. On 2023 she delivered a viable male  38w5d via normal spontaneous vaginal delivery. She sustained a second degree laceration during delivery  which was adequately repaired. 's birth weight was 6lbs 14.6oz; Apgars were 9 (1 min) and 9 (5 min).  was admitted to the  nursery. Patient tolerated the procedure well. Her post-delivery course was uncomplicated. Her postpartum pain was well controlled with oral analgesics. Maternal blood type is O posiitve so RhoGAM was not indicated. On day of discharge, she was ambulating and able to reasonably perform all ADLs. She was voiding and had appropriate bowel function. Pain was well controlled. She was discharged home on postpartum day #1*** without complications. Patient was instructed to follow up with her OBGYN as an outpatient and was given appropriate warnings to call provider if she develops signs of infection or uncontrolled pain. Complications: none apparent    Condition at discharge: good     Provisions for Follow-Up Care:  Please see after visit summary for information related to follow-up care and any pertinent home health orders.       Disposition: Home    Planned Readmission: No    Discharge Medications: Please see AVS for a complete list of discharge medications. Discharge instructions :   Please see AVS for complete discharge instructions.     ***

## 2023-12-13 NOTE — PLAN OF CARE
Problem: PAIN - ADULT  Goal: Verbalizes/displays adequate comfort level or baseline comfort level  Description: Interventions:  - Encourage patient to monitor pain and request assistance  - Assess pain using appropriate pain scale  - Administer analgesics based on type and severity of pain and evaluate response  - Implement non-pharmacological measures as appropriate and evaluate response  - Consider cultural and social influences on pain and pain management  - Notify physician/advanced practitioner if interventions unsuccessful or patient reports new pain  Outcome: Progressing     Problem: INFECTION - ADULT  Goal: Absence or prevention of progression during hospitalization  Description: INTERVENTIONS:  - Assess and monitor for signs and symptoms of infection  - Monitor lab/diagnostic results  - Monitor all insertion sites, i.e. indwelling lines, tubes, and drains  - Monitor endotracheal if appropriate and nasal secretions for changes in amount and color  - [place] appropriate cooling/warming therapies per order  - Administer medications as ordered  - Instruct and encourage patient and family to use good hand hygiene technique  - Identify and instruct in appropriate isolation precautions for identified infection/condition  Outcome: Progressing  Goal: Absence of fever/infection during neutropenic period  Description: INTERVENTIONS:  - Monitor WBC    Outcome: Progressing     Problem: SAFETY ADULT  Goal: Patient will remain free of falls  Description: INTERVENTIONS:  - Educate patient/family on patient safety including physical limitations  - Instruct patient to call for assistance with activity   - Consult OT/PT to assist with strengthening/mobility   - Keep Call bell within reach  - Keep bed low and locked with side rails adjusted as appropriate  - Keep care items and personal belongings within reach  - Initiate and maintain comfort rounds  - Make Fall Risk Sign visible to staff  - Apply yellow socks and bracelet for high fall risk patients  - Consider moving patient to room near nurses station  Outcome: Progressing  Goal: Maintain or return to baseline ADL function  Description: INTERVENTIONS:  -  Assess patient's ability to carry out ADLs; assess patient's baseline for ADL function and identify physical deficits which impact ability to perform ADLs (bathing, care of mouth/teeth, toileting, grooming, dressing, etc.)  - Assess/evaluate cause of self-care deficits   - Assess range of motion  - Assess patient's mobility; develop plan if impaired  - Assess patient's need for assistive devices and provide as appropriate  - Encourage maximum independence but intervene and supervise when necessary  - Involve family in performance of ADLs  - Assess for home care needs following discharge   - Consider OT consult to assist with ADL evaluation and planning for discharge  - Provide patient education as appropriate  Outcome: Progressing  Goal: Maintains/Returns to pre admission functional level  Description: INTERVENTIONS:  - Perform AM-PAC 6 Click Basic Mobility/ Daily Activity assessment daily.  - Set and communicate daily mobility goal to care team and patient/family/caregiver. - Collaborate with rehabilitation services on mobility goals if consulted  - Out of bed for toileting  - Record patient progress and toleration of activity level   Outcome: Progressing     Problem: Knowledge Deficit  Goal: Patient/family/caregiver demonstrates understanding of disease process, treatment plan, medications, and discharge instructions  Description: Complete learning assessment and assess knowledge base.   Interventions:  - Provide teaching at level of understanding  - Provide teaching via preferred learning methods  Outcome: Progressing     Problem: DISCHARGE PLANNING  Goal: Discharge to home or other facility with appropriate resources  Description: INTERVENTIONS:  - Identify barriers to discharge w/patient and caregiver  - Arrange for needed discharge resources and transportation as appropriate  - Identify discharge learning needs (meds, wound care, etc.)  - Arrange for interpretive services to assist at discharge as needed  - Refer to Case Management Department for coordinating discharge planning if the patient needs post-hospital services based on physician/advanced practitioner order or complex needs related to functional status, cognitive ability, or social support system  Outcome: Progressing

## 2023-12-13 NOTE — OB LABOR/OXYTOCIN SAFETY PROGRESS
Oxytocin Safety Progress Check Note - Zandra Kelly 34 y.o. female MRN: [id number]    Unit/Bed#: -01 Encounter: [id number]    Dose (denys-units/min) Oxytocin: 0 denys-units/min  Contraction Frequency (minutes): 3  Contraction Intensity: Mild/Moderate  Uterine Activity Characteristics: Irregular    Cervical Dilation: 4  Cervical Effacement: 50  Fetal Station: -2    Baseline Rate (FHR): 135 bpm  Fetal Heart Rate (FHT): 135 BPM  FHR Category: 2       Pt tolerating contractions well. FHT with variable decelerations, initially becoming more recurrent, however resolving after SVE and repositioning. Change as above.     Vital Signs:  Vitals:    12/12/23 2145   BP: 113/64   Pulse: 71   Resp:    Temp:    SpO2:      D/w Dr. Talisha Steven MD 12/12/2023 10:13 PM

## 2023-12-13 NOTE — H&P
[address] 34 y.o. female MRN: [id number]  Unit/Bed#: -01 Encounter: [id number]    Assessment: 34 y.o.  at 38w4d admitted for spontaneous rupture of membranes. SVE: 3/50/-2  FHT: category I  Clinical EFW: 32% ; Cephalic confirmed by TAUS  GBS status: negative    Postpartum contraception plan: POPs    Plan:   Diet controlled gestational diabetes mellitus (GDM) in third trimester  Assessment & Plan  A1GDM protocol     37 weeks gestation of pregnancy  Assessment & Plan  O pos, GBS neg, EFW 32%    * PROM (premature rupture of membranes)  Assessment & Plan  0840 at home, blood tinged   Admit to OBGYN   Clear liquid diet   F/u T&S, CBC, RPR   IVF LR 125cc/hr   Continuous fetal monitoring and tocometry   Analgesia at maternal request   Vertex by TAUS  Expectant management, consider augmentation if cervical exam is unchanged            Discussed case and plan w/ Dr. Lucía Flor       Chief Complaint: leaking fluid    HPI: Yanna Barrett is a 34 y.o. Kathyleen Drafts with an CHUY of 2023, by Ultrasound at 38w4d who is being admitted for spontaneous rupture of membranes. She denies having uterine contractions, has moderate LOF, and reports no VB. She states she has felt good FM. Patient Active Problem List   Diagnosis    37 weeks gestation of pregnancy    Diet controlled gestational diabetes mellitus (GDM) in third trimester    PROM (premature rupture of membranes)       Baby complications/comments: H3ELV    Review of Systems   Constitutional:  Negative for chills and fever. HENT:  Negative for congestion, rhinorrhea, sneezing and sore throat. Eyes:  Negative for photophobia and visual disturbance. Respiratory:  Negative for cough and shortness of breath. Cardiovascular:  Negative for chest pain. Gastrointestinal:  Negative for diarrhea, nausea and vomiting. Genitourinary:  Negative for dysuria and frequency.    Neurological:  Negative for dizziness, numbness and headaches. Psychiatric/Behavioral: Negative. OB Hx:  OB History    Para Term  AB Living   3 1 1   1 1   SAB IAB Ectopic Multiple Live Births           1      # Outcome Date GA Lbr Adan/2nd Weight Sex Delivery Anes PTL Lv   3 Current            2 AB 2018 6w0d          1 Term 10/19/12   2600 g (5 lb 11.7 oz) M Vag-Spont  N MIKHAIL       Past Medical Hx:  Past Medical History:   Diagnosis Date    Lumbar hernia     Varicella        Past Surgical hx:  History reviewed. No pertinent surgical history. Social Hx:  Alcohol use: denies  Tobacco use: denies  Other substance use: denies      No Known Allergies    No medications prior to admission. Objective:  Temp:  [97.5 °F (36.4 °C)-98.4 °F (36.9 °C)] 98.4 °F (36.9 °C)  HR:  [64-81] 64  Resp:  [16-18] 16  BP: (107-116)/(64-72) 116/72  Body mass index is 36.21 kg/m². Physical Exam:  Physical Exam  Constitutional:       General: She is not in acute distress. Appearance: Normal appearance. She is not ill-appearing. HENT:      Head: Normocephalic and atraumatic. Mouth/Throat:      Mouth: Mucous membranes are moist. No oral lesions. Eyes:      General: No scleral icterus. Extraocular Movements: Extraocular movements intact. Cardiovascular:      Rate and Rhythm: Normal rate. Pulmonary:      Effort: Pulmonary effort is normal. No respiratory distress. Musculoskeletal:      Right lower leg: No edema. Left lower leg: No edema. Neurological:      General: No focal deficit present. Mental Status: She is alert. Skin:     General: Skin is warm and dry. Psychiatric:         Attention and Perception: Attention normal.         Mood and Affect: Mood normal.         Speech: Speech normal.         Behavior: Behavior normal.         Thought Content: Thought content normal.         Judgment: Judgment normal.   Vitals and nursing note reviewed. Exam conducted with a chaperone present.             FHT:  Baseline Rate (FHR): 135 bpm  Variability: Moderate 6-25 BPM  Accelerations: 15 x 15 or greater, At variable times  Decelerations: None  FHR Category: Category I    TOCO:   Contraction Frequency (minutes): 0  Contraction Duration (seconds): 0  Contraction Intensity: Mild    Lab Results   Component Value Date    WBC 7.74 12/12/2023    HGB 12.6 12/12/2023    HCT 39.6 12/12/2023     12/12/2023     No results found for: "NA", "K", "CL", "CO2", "BUN", "CREATININE", "GLUCOSE", "AST", "ALT"  Prenatal Labs: Reviewed      Blood type: O pos  Antibody: neg  GBS: neg  HIV: non-reactive  Rubella: immune  Syphilis IgM/IgG: non-reactive  HBsAg: non-reactive   HCAb: non-reactive   Chlamydia: negative   Gonorrhea: negative   Diabetes 1 hour screen: 170  3 hour glucose: 82, 202, 181, 115  Platelets: 766 on admission   Hgb: 12.6 on admission   >2 Midnights  INPATIENT     Signature/Title: Mitzy Myers MD  Date: 12/12/2023  Time: 7:27 PM

## 2023-12-13 NOTE — OB LABOR/OXYTOCIN SAFETY PROGRESS
Oxytocin Safety Progress Check Note - Doug Pereyra 34 y.o. female MRN: [id number]    Unit/Bed#: -01 Encounter: [id number]    Dose (denys-units/min) Oxytocin: 0 denys-units/min  Contraction Frequency (minutes): 1.5-4  Contraction Intensity: Mild/Moderate  Uterine Activity Characteristics: Regular  Cervical Dilation: 9        Cervical Effacement: 90  Fetal Station: -1  Baseline Rate (FHR): 135 bpm  Fetal Heart Rate (FHT): 136 BPM  FHR Category: 1               Vital Signs:   Vitals:    12/13/23 0519   BP: 104/56   Pulse: 86   Resp: 18   Temp: 97.9 °F (36.6 °C)   SpO2:        Notes/comments:   SVE as above. Category I tracing with amnioinfusion running. Continue expectant management. D/w Dr. Meg Lopez.       Pedro Wilks MD 12/13/2023 5:35 AM

## 2023-12-14 VITALS
BODY MASS INDEX: 36.44 KG/M2 | WEIGHT: 198 LBS | HEIGHT: 62 IN | TEMPERATURE: 98 F | OXYGEN SATURATION: 97 % | SYSTOLIC BLOOD PRESSURE: 107 MMHG | HEART RATE: 90 BPM | RESPIRATION RATE: 18 BRPM | DIASTOLIC BLOOD PRESSURE: 52 MMHG

## 2023-12-14 DIAGNOSIS — Z13.1 DIABETES MELLITUS SCREENING: Primary | ICD-10-CM

## 2023-12-14 DIAGNOSIS — Z86.32 HISTORY OF GESTATIONAL DIABETES: ICD-10-CM

## 2023-12-14 PROCEDURE — 99024 POSTOP FOLLOW-UP VISIT: CPT | Performed by: OBSTETRICS & GYNECOLOGY

## 2023-12-14 PROCEDURE — NC001 PR NO CHARGE: Performed by: OBSTETRICS & GYNECOLOGY

## 2023-12-14 RX ORDER — IBUPROFEN 600 MG/1
600 TABLET ORAL EVERY 6 HOURS PRN
Start: 2023-12-14

## 2023-12-14 RX ORDER — ACETAMINOPHEN 325 MG/1
650 TABLET ORAL EVERY 6 HOURS PRN
Start: 2023-12-14

## 2023-12-14 RX ADMIN — SENNOSIDES 8.6 MG: 8.6 TABLET, FILM COATED ORAL at 10:33

## 2023-12-14 RX ADMIN — ACETAMINOPHEN 650 MG: 325 TABLET, FILM COATED ORAL at 06:41

## 2023-12-14 RX ADMIN — IBUPROFEN 600 MG: 600 TABLET, FILM COATED ORAL at 10:33

## 2023-12-14 RX ADMIN — DOCUSATE SODIUM 100 MG: 100 CAPSULE, LIQUID FILLED ORAL at 10:33

## 2023-12-14 NOTE — PROGRESS NOTES
Progress Note - OB/GYN  Chi Jimenez 34 y.o. female MRN: [id number]  Unit/Bed#: -01 Encounter: [id number]    Assessment and Plan      #[id number] used during interview    Chi Jimenez is a patient of: [address] [place] [address]. She is PPD# 1 s/p  spontaneous vaginal delivery  Recovering well and is stable. *  (spontaneous vaginal delivery)  Assessment & Plan  Lochia WNL   Recovering well   Appropriate bowel and bladder function   Pain well controlled   Tolerating diet   Breastfeeding: yes with formula supplementation  Ambulating without issues   No lower extremity tenderness  GBS negative   Rh positve       PROM (premature rupture of membranes)  Assessment & Plan  0840 at home, blood tinged   Patient has remained afebrile during this admission.       Diet controlled gestational diabetes mellitus (GDM) in third trimester  Assessment & Plan  Plan for 2-hour Glucola at 6 weeks post-partum        Disposition    - Anticipate discharge home on PPD# 1      Subjective/Objective     Chief Complaint: Postpartum State     Subjective:    Chi Jimenez is PPD #1 s/p  spontaneous vaginal delivery. She has no current complaints. Pain is well controlled. Patient is currently voiding. She is ambulating. Patient is currently passing flatus and has had no bowel movement. She is tolerating PO, and denies nausea or vomitting. Patient denies fever, chills, chest pain, shortness of breath. Lochia is normal. She is breastfeeding with some formula supplementation. She is recovering well and is stable. She would like to be discharged today.       Vitals:   /55 (BP Location: Left arm)   Pulse 81   Temp 98.2 °F (36.8 °C) (Oral)   Resp 16   Ht 5' 2" (1.575 m)   Wt 89.8 kg (198 lb)   LMP 2023   SpO2 99%   Breastfeeding Yes   BMI 36.21 kg/m²       Intake/Output Summary (Last 24 hours) at 2023 7376  Last data filed at 2023 1658  Gross per 24 hour   Intake --   Output 662 ml   Net -662 ml       Invasive Devices       Peripheral Intravenous Line  Duration             Peripheral IV 12/12/23 Distal;Dorsal (posterior); Left Forearm 1 day                    Physical Exam:   GEN: Zandra Chill appears well, alert and oriented x 3, pleasant and cooperative   CARDIO: RRR, no murmurs or rubs  RESP:  CTAB, no wheezes or rales  ABDOMEN: soft, no tenderness, no distention, fundus @ U-1  EXTREMITIES: non tender, no erythema      Labs:     Hemoglobin   Date Value Ref Range Status   12/12/2023 12.6 11.5 - 15.4 g/dL Final   10/13/2023 12.1 11.5 - 15.4 g/dL Final     WBC   Date Value Ref Range Status   12/12/2023 7.74 4.31 - 10.16 Thousand/uL Final   10/13/2023 9.31 4.31 - 10.16 Thousand/uL Final     Platelets   Date Value Ref Range Status   12/12/2023 256 149 - 390 Thousands/uL Final   10/13/2023 276 149 - 390 Thousands/uL Final          Ana Hector DO  12/14/2023  6:52 AM

## 2023-12-14 NOTE — PLAN OF CARE
Problem: PAIN - ADULT  Goal: Verbalizes/displays adequate comfort level or baseline comfort level  Description: Interventions:  - Encourage patient to monitor pain and request assistance  - Assess pain using appropriate pain scale  - Administer analgesics based on type and severity of pain and evaluate response  - Implement non-pharmacological measures as appropriate and evaluate response  - Consider cultural and social influences on pain and pain management  - Notify physician/advanced practitioner if interventions unsuccessful or patient reports new pain  Outcome: Progressing     Problem: INFECTION - ADULT  Goal: Absence or prevention of progression during hospitalization  Description: INTERVENTIONS:  - Assess and monitor for signs and symptoms of infection  - Monitor lab/diagnostic results  - Monitor all insertion sites, i.e. indwelling lines, tubes, and drains  - Monitor endotracheal if appropriate and nasal secretions for changes in amount and color  - [place] appropriate cooling/warming therapies per order  - Administer medications as ordered  - Instruct and encourage patient and family to use good hand hygiene technique  - Identify and instruct in appropriate isolation precautions for identified infection/condition  Outcome: Progressing  Goal: Absence of fever/infection during neutropenic period  Description: INTERVENTIONS:  - Monitor WBC    Outcome: Progressing     Problem: SAFETY ADULT  Goal: Patient will remain free of falls  Description: INTERVENTIONS:  - Educate patient/family on patient safety including physical limitations  - Instruct patient to call for assistance with activity   - Consult OT/PT to assist with strengthening/mobility   - Keep Call bell within reach  - Keep bed low and locked with side rails adjusted as appropriate  - Keep care items and personal belongings within reach  - Initiate and maintain comfort rounds  - Make Fall Risk Sign visible to staff  - Apply yellow socks and bracelet for high fall risk patients  - Consider moving patient to room near nurses station  Outcome: Progressing  Goal: Maintain or return to baseline ADL function  Description: INTERVENTIONS:  -  Assess patient's ability to carry out ADLs; assess patient's baseline for ADL function and identify physical deficits which impact ability to perform ADLs (bathing, care of mouth/teeth, toileting, grooming, dressing, etc.)  - Assess/evaluate cause of self-care deficits   - Assess range of motion  - Assess patient's mobility; develop plan if impaired  - Assess patient's need for assistive devices and provide as appropriate  - Encourage maximum independence but intervene and supervise when necessary  - Involve family in performance of ADLs  - Assess for home care needs following discharge   - Consider OT consult to assist with ADL evaluation and planning for discharge  - Provide patient education as appropriate  Outcome: Progressing  Goal: Maintains/Returns to pre admission functional level  Description: INTERVENTIONS:  - Perform AM-PAC 6 Click Basic Mobility/ Daily Activity assessment daily.  - Set and communicate daily mobility goal to care team and patient/family/caregiver. - Collaborate with rehabilitation services on mobility goals if consulted  - Out of bed for toileting  - Record patient progress and toleration of activity level   Outcome: Progressing     Problem: Knowledge Deficit  Goal: Patient/family/caregiver demonstrates understanding of disease process, treatment plan, medications, and discharge instructions  Description: Complete learning assessment and assess knowledge base.   Interventions:  - Provide teaching at level of understanding  - Provide teaching via preferred learning methods  Outcome: Progressing     Problem: DISCHARGE PLANNING  Goal: Discharge to home or other facility with appropriate resources  Description: INTERVENTIONS:  - Identify barriers to discharge w/patient and caregiver  - Arrange for needed discharge resources and transportation as appropriate  - Identify discharge learning needs (meds, wound care, etc.)  - Arrange for interpretive services to assist at discharge as needed  - Refer to Case Management Department for coordinating discharge planning if the patient needs post-hospital services based on physician/advanced practitioner order or complex needs related to functional status, cognitive ability, or social support system  Outcome: Progressing     Problem: POSTPARTUM  Goal: Experiences normal postpartum course  Description: INTERVENTIONS:  - Monitor maternal vital signs  - Assess uterine involution and lochia  Outcome: Progressing  Goal: Appropriate maternal -  bonding  Description: INTERVENTIONS:  - Identify family support  - Assess for appropriate maternal/infant bonding   -Encourage maternal/infant bonding opportunities  - Referral to  or  as needed  Outcome: Progressing  Goal: Establishment of infant feeding pattern  Description: INTERVENTIONS:  - Assess breast/bottle feeding  - Refer to lactation as needed  Outcome: Progressing  Goal: Incision(s), wounds(s) or drain site(s) healing without S/S of infection  Description: INTERVENTIONS  - Assess and document dressing, incision, wound bed, drain sites and surrounding tissue  - Provide patient and family education  Outcome: Progressing     Problem: ALTERATION IN THE BREAST  Goal: Optimize infant feeding at the breast  Description: INTERVENTIONS:  - Latch, breast and nipple assessment  - Assess prior breast feeding history  - Hand expression of breast milk  - For cracked, bleeding and or sore nipples reassess latch, treat damaged nipple  -Educate mother on feeding cues  -Positioning/latch techniques  Outcome: Progressing     Problem: INADEQUATE LATCH, SUCK OR SWALLOW  Goal: Demonstrate ability to latch and sustain latch, audible swallowing and satiety  Description: INTERVENTIONS:  - Assess oral anatomy, notify Physician/AP for abnormal findings  - Establish milk expression  - Maximize feeding opportunity (skin to skin, behavioral state)  - Position/latch techniques  - Discourage use of pacifier-artificial nipple  - Mechanical pumping  - Nipple Shield  - Supplemental formula feeding (Physician/AP order)  - Alternative feeding method  Outcome: Progressing

## 2023-12-14 NOTE — LACTATION NOTE
This note was copied from a baby's chart. CONSULT - LACTATION  Baby Boy ([address] [facility]) Evia Osler 0 days male MRN: [id number]    [address] [facility] NURSERY Room / Bed: (N)/(N) Encounter: [id number]    Maternal Information     MOTHER:  Angelina Farooq  Maternal Age: 34 y.o.   OB History: # 1 - Date: 10/19/12, Sex: Male, Weight: 2600 g (5 lb 11.7 oz), GA: None, Delivery: Vaginal, Spontaneous, Apgar1: None, Apgar5: None, Living: Living, Birth Comments: None    # 2 - Date: 2018, Sex: None, Weight: None, GA: 6w0d, Delivery: None, Apgar1: None, Apgar5: None, Living: None, Birth Comments: None    # 3 - Date: 23, Sex: Male, Weight: 3135 g (6 lb 14.6 oz), GA: 38w5d, Delivery: Vaginal, Spontaneous, Apgar1: 9, Apgar5: 9, Living: Living, Birth Comments: None   Previouse breast reduction surgery? No    Lactation history:   Has patient previously breast fed: Yes   How long had patient previously breast fed: Her 6yo son for 2 years. Previous breast feeding complications: None   History reviewed. No pertinent surgical history. Birth information:  YOB: 2021   Time of birth: 8:05 AM   Sex: male   Delivery type: Vaginal, Spontaneous   Birth Weight: 3135 g (6 lb 14.6 oz)   Percent of Weight Change: 0%     Gestational Age: 40w9d   [unfilled]    Assessment     Breast and nipple assessment: normal assessment     Assessment: normal assessment    Feeding assessment: latch difficulty (Mom had difficulty achieving a deep latch)  LATCH:  Latch: Repeated attempts, hold nipple in mouth, stimulate to suck   Audible Swallowing: Spontaneous and intermittent (24 hours old)   Type of Nipple: Everted (After stimulation)   Comfort (Breast/Nipple): Soft/non-tender   Hold (Positioning): Partial assist, teach one side, mother does other, staff holds   LATCH Score: 8          Feeding recommendations:   Place baby at the breast every 2-3 hours.     Latch assessment:  Education on skin to skin for feedings, hand expression demonstration with teach back. Use pillows to support arms & bring baby up to breast. Use "C" compression of breast and keep fingers away from face. Bring chin to breast, align nipple to nose, drag down to chin to achieve a wide open mouth and a deep latch to the breast.   Cheek to touch breast tissue with ear, shoulder hip alignment. Watch hands for signs of satiation. Use breast compressions to assist with milk transfer. Stimulate between breasts with burping techniques and diaper changes. Offer both breasts at every feeding session. Ranjan Yip was complaining of nipple pain when baby was latched at the breast, with assistance she was able to latch her baby at the breast with no pain. Provided  Leoncio with the Ready Set Baby Booklet and reviewed information. Discussed Skin to Skin contact and benefits to mom and baby. Feeding cues and what that means for recognizing infant's hunger reviewed. Avoidance of pacifiers for the first month discussed. Talked about exclusive breastfeeding for the first 6 months. Positioning and latch reviewed as well as showing images of other feeding positions. Discussed the properties of a good latch in any position. Reviewed hand/manual expression. Gave information on common concerns, what to expect the first few weeks after delivery, preparing for other caregivers, and how partners can help. Resources for support also provided. Also provided her with the Discharge Breastfeeding Booklet to review. All written and verbal education was supplied in [employer] [place]. [employer]  # [id number] [facility]) was used for [employer] [place] translation. Encouraged Leoncio to call out for additional support and/or questions as needed. Phone number provided. Ranjan Yip does not have health insurance. Did supply her with a manual breast pump.                       Bozena Pierre RN 12/13/2023 7:15 PM

## 2023-12-14 NOTE — ASSESSMENT & PLAN NOTE
Lochia WNL   Recovering well   Appropriate bowel and bladder function   Pain well controlled   Tolerating diet   Breastfeeding: yes with formula supplementation  Ambulating without issues   No lower extremity tenderness  GBS negative   Rh positve

## 2023-12-14 NOTE — PLAN OF CARE
Problem: PAIN - ADULT  Goal: Verbalizes/displays adequate comfort level or baseline comfort level  Description: Interventions:  - Encourage patient to monitor pain and request assistance  - Assess pain using appropriate pain scale  - Administer analgesics based on type and severity of pain and evaluate response  - Implement non-pharmacological measures as appropriate and evaluate response  - Consider cultural and social influences on pain and pain management  - Notify physician/advanced practitioner if interventions unsuccessful or patient reports new pain  Outcome: Completed     Problem: INFECTION - ADULT  Goal: Absence or prevention of progression during hospitalization  Description: INTERVENTIONS:  - Assess and monitor for signs and symptoms of infection  - Monitor lab/diagnostic results  - Monitor all insertion sites, i.e. indwelling lines, tubes, and drains  - Monitor endotracheal if appropriate and nasal secretions for changes in amount and color  - [place] appropriate cooling/warming therapies per order  - Administer medications as ordered  - Instruct and encourage patient and family to use good hand hygiene technique  - Identify and instruct in appropriate isolation precautions for identified infection/condition  Outcome: Completed  Goal: Absence of fever/infection during neutropenic period  Description: INTERVENTIONS:  - Monitor WBC    Outcome: Completed     Problem: SAFETY ADULT  Goal: Patient will remain free of falls  Description: INTERVENTIONS:  - Educate patient/family on patient safety including physical limitations  - Instruct patient to call for assistance with activity   - Consult OT/PT to assist with strengthening/mobility   - Keep Call bell within reach  - Keep bed low and locked with side rails adjusted as appropriate  - Keep care items and personal belongings within reach  - Initiate and maintain comfort rounds  - Make Fall Risk Sign visible to staff  - Offer Toileting every  Hours, in advance of need  - Initiate/Maintain alarm  - Obtain necessary fall risk management equipment:   - Apply yellow socks and bracelet for high fall risk patients  - Consider moving patient to room near nurses station  Outcome: Completed  Goal: Maintain or return to baseline ADL function  Description: INTERVENTIONS:  -  Assess patient's ability to carry out ADLs; assess patient's baseline for ADL function and identify physical deficits which impact ability to perform ADLs (bathing, care of mouth/teeth, toileting, grooming, dressing, etc.)  - Assess/evaluate cause of self-care deficits   - Assess range of motion  - Assess patient's mobility; develop plan if impaired  - Assess patient's need for assistive devices and provide as appropriate  - Encourage maximum independence but intervene and supervise when necessary  - Involve family in performance of ADLs  - Assess for home care needs following discharge   - Consider OT consult to assist with ADL evaluation and planning for discharge  - Provide patient education as appropriate  Outcome: Completed  Goal: Maintains/Returns to pre admission functional level  Description: INTERVENTIONS:  - Perform AM-PAC 6 Click Basic Mobility/ Daily Activity assessment daily.  - Set and communicate daily mobility goal to care team and patient/family/caregiver. - Collaborate with rehabilitation services on mobility goals if consulted  - Perform Range of Motion  times a day. - Reposition patient every  hours.   - Dangle patient  times a day  - Stand patient  times a day  - Ambulate patient  times a day  - Out of bed to chair  times a day   - Out of bed for meals  times a day  - Out of bed for toileting  - Record patient progress and toleration of activity level   Outcome: Completed     Problem: Knowledge Deficit  Goal: Patient/family/caregiver demonstrates understanding of disease process, treatment plan, medications, and discharge instructions  Description: Complete learning assessment and assess knowledge base.   Interventions:  - Provide teaching at level of understanding  - Provide teaching via preferred learning methods  Outcome: Completed     Problem: DISCHARGE PLANNING  Goal: Discharge to home or other facility with appropriate resources  Description: INTERVENTIONS:  - Identify barriers to discharge w/patient and caregiver  - Arrange for needed discharge resources and transportation as appropriate  - Identify discharge learning needs (meds, wound care, etc.)  - Arrange for interpretive services to assist at discharge as needed  - Refer to Case Management Department for coordinating discharge planning if the patient needs post-hospital services based on physician/advanced practitioner order or complex needs related to functional status, cognitive ability, or social support system  Outcome: Completed     Problem: POSTPARTUM  Goal: Experiences normal postpartum course  Description: INTERVENTIONS:  - Monitor maternal vital signs  - Assess uterine involution and lochia  Outcome: Completed  Goal: Appropriate maternal -  bonding  Description: INTERVENTIONS:  - Identify family support  - Assess for appropriate maternal/infant bonding   -Encourage maternal/infant bonding opportunities  - Referral to  or  as needed  Outcome: Completed  Goal: Establishment of infant feeding pattern  Description: INTERVENTIONS:  - Assess breast/bottle feeding  - Refer to lactation as needed  Outcome: Completed  Goal: Incision(s), wounds(s) or drain site(s) healing without S/S of infection  Description: INTERVENTIONS  - Assess and document dressing, incision, wound bed, drain sites and surrounding tissue  - Provide patient and family education  - Perform skin care/dressing changes every   Outcome: Completed     Problem: ALTERATION IN THE BREAST  Goal: Optimize infant feeding at the breast  Description: INTERVENTIONS:  - Latch, breast and nipple assessment  - Assess prior breast feeding history  - Hand expression of breast milk  - For cracked, bleeding and or sore nipples reassess latch, treat damaged nipple  -Educate mother on feeding cues  -Positioning/latch techniques  Outcome: Completed     Problem: INADEQUATE LATCH, SUCK OR SWALLOW  Goal: Demonstrate ability to latch and sustain latch, audible swallowing and satiety  Description: INTERVENTIONS:  - Assess oral anatomy, notify Physician/AP for abnormal findings  - Establish milk expression  - Maximize feeding opportunity (skin to skin, behavioral state)  - Position/latch techniques  - Discourage use of pacifier-artificial nipple  - Mechanical pumping  - Nipple Shield  - Supplemental formula feeding (Physician/AP order)  - Alternative feeding method  Outcome: Completed

## 2023-12-14 NOTE — PLAN OF CARE
Problem: PAIN - ADULT  Goal: Verbalizes/displays adequate comfort level or baseline comfort level  Description: Interventions:  - Encourage patient to monitor pain and request assistance  - Assess pain using appropriate pain scale  - Administer analgesics based on type and severity of pain and evaluate response  - Implement non-pharmacological measures as appropriate and evaluate response  - Consider cultural and social influences on pain and pain management  - Notify physician/advanced practitioner if interventions unsuccessful or patient reports new pain  Outcome: Progressing     Problem: INFECTION - ADULT  Goal: Absence or prevention of progression during hospitalization  Description: INTERVENTIONS:  - Assess and monitor for signs and symptoms of infection  - Monitor lab/diagnostic results  - Monitor all insertion sites, i.e. indwelling lines, tubes, and drains  - Monitor endotracheal if appropriate and nasal secretions for changes in amount and color  - [employer] appropriate cooling/warming therapies per order  - Administer medications as ordered  - Instruct and encourage patient and family to use good hand hygiene technique  - Identify and instruct in appropriate isolation precautions for identified infection/condition  Outcome: Progressing  Goal: Absence of fever/infection during neutropenic period  Description: INTERVENTIONS:  - Monitor WBC    Outcome: Progressing     Problem: SAFETY ADULT  Goal: Patient will remain free of falls  Description: INTERVENTIONS:  - Educate patient/family on patient safety including physical limitations  - Instruct patient to call for assistance with activity   - Consult OT/PT to assist with strengthening/mobility   - Keep Call bell within reach  - Keep bed low and locked with side rails adjusted as appropriate  - Keep care items and personal belongings within reach  - Initiate and maintain comfort rounds  - Make Fall Risk Sign visible to staff  - Offer Toileting every  Hours, in advance of need  - Initiate/Maintain alarm  - Obtain necessary fall risk management equipment:  - Apply yellow socks and bracelet for high fall risk patients  - Consider moving patient to room near nurses station  Outcome: Progressing  Goal: Maintain or return to baseline ADL function  Description: INTERVENTIONS:  -  Assess patient's ability to carry out ADLs; assess patient's baseline for ADL function and identify physical deficits which impact ability to perform ADLs (bathing, care of mouth/teeth, toileting, grooming, dressing, etc.)  - Assess/evaluate cause of self-care deficits   - Assess range of motion  - Assess patient's mobility; develop plan if impaired  - Assess patient's need for assistive devices and provide as appropriate  - Encourage maximum independence but intervene and supervise when necessary  - Involve family in performance of ADLs  - Assess for home care needs following discharge   - Consider OT consult to assist with ADL evaluation and planning for discharge  - Provide patient education as appropriate  Outcome: Progressing  Goal: Maintains/Returns to pre admission functional level  Description: INTERVENTIONS:  - Perform AM-PAC 6 Click Basic Mobility/ Daily Activity assessment daily.  - Set and communicate daily mobility goal to care team and patient/family/caregiver. - Collaborate with rehabilitation services on mobility goals if consulted  - Perform Range of Motion  times a day. - Reposition patient every  hours.   - Dangle patient  times a day  - Stand patient  times a day  - Ambulate patient  times a day  - Out of bed to chair  times a day   - Out of bed for meals  times a day  - Out of bed for toileting  - Record patient progress and toleration of activity level   Outcome: Progressing     Problem: Knowledge Deficit  Goal: Patient/family/caregiver demonstrates understanding of disease process, treatment plan, medications, and discharge instructions  Description: Complete learning assessment and assess knowledge base.   Interventions:  - Provide teaching at level of understanding  - Provide teaching via preferred learning methods  Outcome: Progressing     Problem: DISCHARGE PLANNING  Goal: Discharge to home or other facility with appropriate resources  Description: INTERVENTIONS:  - Identify barriers to discharge w/patient and caregiver  - Arrange for needed discharge resources and transportation as appropriate  - Identify discharge learning needs (meds, wound care, etc.)  - Arrange for interpretive services to assist at discharge as needed  - Refer to Case Management Department for coordinating discharge planning if the patient needs post-hospital services based on physician/advanced practitioner order or complex needs related to functional status, cognitive ability, or social support system  Outcome: Progressing     Problem: POSTPARTUM  Goal: Experiences normal postpartum course  Description: INTERVENTIONS:  - Monitor maternal vital signs  - Assess uterine involution and lochia  Outcome: Progressing  Goal: Appropriate maternal -  bonding  Description: INTERVENTIONS:  - Identify family support  - Assess for appropriate maternal/infant bonding   -Encourage maternal/infant bonding opportunities  - Referral to  or  as needed  Outcome: Progressing  Goal: Establishment of infant feeding pattern  Description: INTERVENTIONS:  - Assess breast/bottle feeding  - Refer to lactation as needed  Outcome: Progressing  Goal: Incision(s), wounds(s) or drain site(s) healing without S/S of infection  Description: INTERVENTIONS  - Assess and document dressing, incision, wound bed, drain sites and surrounding tissue  - Provide patient and family education  - Perform skin care/dressing changes every   Outcome: Progressing     Problem: ALTERATION IN THE BREAST  Goal: Optimize infant feeding at the breast  Description: INTERVENTIONS:  - Latch, breast and nipple assessment  - Assess prior breast feeding history  - Hand expression of breast milk  - For cracked, bleeding and or sore nipples reassess latch, treat damaged nipple  -Educate mother on feeding cues  -Positioning/latch techniques  Outcome: Progressing     Problem: INADEQUATE LATCH, SUCK OR SWALLOW  Goal: Demonstrate ability to latch and sustain latch, audible swallowing and satiety  Description: INTERVENTIONS:  - Assess oral anatomy, notify Physician/AP for abnormal findings  - Establish milk expression  - Maximize feeding opportunity (skin to skin, behavioral state)  - Position/latch techniques  - Discourage use of pacifier-artificial nipple  - Mechanical pumping  - Nipple Shield  - Supplemental formula feeding (Physician/AP order)  - Alternative feeding method  Outcome: Progressing

## 2023-12-14 NOTE — LACTATION NOTE
This note was copied from a baby's chart. CONSULT - LACTATION  Baby Boy ([address] [facility]) Kristofer Cesar 1 days male MRN: [id number]    [address] [facility] NURSERY Room / Bed: (N)/(N) Encounter: [id number]    Maternal Information     MOTHER:  Zandra Kelly  Maternal Age: 34 y.o.   OB History: # 1 - Date: 10/19/12, Sex: Male, Weight: 2600 g (5 lb 11.7 oz), GA: None, Delivery: Vaginal, Spontaneous, Apgar1: None, Apgar5: None, Living: Living, Birth Comments: None    # 2 - Date: 2018, Sex: None, Weight: None, GA: 6w0d, Delivery: None, Apgar1: None, Apgar5: None, Living: None, Birth Comments: None    # 3 - Date: 23, Sex: Male, Weight: 3135 g (6 lb 14.6 oz), GA: 38w5d, Delivery: Vaginal, Spontaneous, Apgar1: 9, Apgar5: 9, Living: Living, Birth Comments: None   Previouse breast reduction surgery? No    Lactation history:   Has patient previously breast fed: Yes   How long had patient previously breast fed: Her 8yo son for 2 years. Previous breast feeding complications: None   History reviewed. No pertinent surgical history. Birth information:  YOB: 2021   Time of birth: 8:05 AM   Sex: male   Delivery type: Vaginal, Spontaneous   Birth Weight: 3135 g (6 lb 14.6 oz)   Percent of Weight Change: -3%     Gestational Age: 40w9d   [unfilled]    Assessment     Breast and nipple assessment:  bilateral nipple tenderness. [place] Assessment: sleepy    Feeding assessment:  not assessed at this time.       23 0900   Lactation Consultation   Reason for Consult 20 minutes   Risk Factors Language barrier  ( # [id number])   Breasts/Nipples   Left Breast Soft   Right Breast Soft   Left Nipple Tender;Everted   Right Nipple Tender;Everted   Intervention Hand expression   Breastfeeding Progress Not yet established   Other OB Lactation Tools   Feeding Devices Bottle   Breast Pump   Pump 1  (hand pumps given yesterday)   Patient Follow-Up   Lactation Consult Status 2   Follow-Up Type Inpatient;Call as needed   Other OB Lactation Documentation    Additional Problem Noted Mom supplementing with formula due to sugars. Mom giving 10-15mLs of formula. Mom is nursing for about 5-10 minutes total and then finishing with formula. Encouraged to pump after nursing to protect milk supply. Mixed feeding plan set up. Reviewed proper position and latch. Encouraged to call for latch assessment.  (D/C booklet reviewed.)       Feeding recommendations:  breast feed on demand    Feeding Plan:  1. Meet early feeding cues  2. Bring baby to breast skin to skin  3. Tuck chin deeply into the breast to assist with deeper latch  4. Align nipple to nose, chin deep into breast, (move baby not breast) and bring baby to breast when mouth is wide and deep latch is achieved. 5. Use breast compressions to stimulate suck  6. introduce breast first for feeding  7. to feed infant expressed breast milk after breast  8. feed infant formula  9.  to pump after as many feedings at the breast as reasonable  10. Follow up with outpatient lactation as soon as possible     Provided demonstration, education and support of deep latch to breast by placing the nipple to the nose, dragging down to chin to achieve a wide latch. Bring baby to the breast, not breast to baby. Move your shoulders down and away from your ears. Look for ear, shoulder, hip alignment. Baby's upper and lower lip should be flanged on the breast.    Met with mother to go over discharge breastfeeding booklet including the feeding log. Emphasized 8 or more (12) feedings in a 24 hour period, what to expect for the number of diapers per day of life and the progression of properties of the  stooling pattern. List of reasons to call a lactation consultant.   Feeding logs  Feeding cues  Hand expression  Baby's Second day (cluster feeding)  Breastfeeding and Your Lifestyle (Medications, Alcohol, Caffeine, Smoking, Street Drugs, Methadone)  First Two Weeks Survival Guide for Breastfeeding  Breast Changes  Physical Therapy  Storage and Handling of Breast milk  How to Keep Your Breast Pump Kit Clean  The Employed Breastfeeding Mother  Mixed feeding  Bottle feeding like breastfeeding (paced bottle feeding)  astfeeding and your lifestyle, storage and preparation of breast milk, how to keep you breast pump clean, the employed breastfeeding mother and paced bottle feeding handouts. Booklet included Breastfeeding Resources for after discharge including access to the number for the [employer].       Jose Mccallum 12/14/2023 9:12 AM

## 2023-12-15 DIAGNOSIS — Z13.1 SCREENING FOR DIABETES MELLITUS (DM): ICD-10-CM

## 2023-12-15 DIAGNOSIS — Z86.32 HISTORY OF GESTATIONAL DIABETES MELLITUS (GDM): Primary | ICD-10-CM

## 2023-12-19 LAB — PLACENTA IN STORAGE: NORMAL

## 2024-01-04 ENCOUNTER — POSTPARTUM VISIT (OUTPATIENT)
Dept: OBGYN CLINIC | Facility: CLINIC | Age: 29
End: 2024-01-04

## 2024-01-04 VITALS
TEMPERATURE: 97.5 F | SYSTOLIC BLOOD PRESSURE: 102 MMHG | WEIGHT: 183.2 LBS | HEIGHT: 62 IN | BODY MASS INDEX: 33.71 KG/M2 | HEART RATE: 74 BPM | DIASTOLIC BLOOD PRESSURE: 64 MMHG

## 2024-01-04 DIAGNOSIS — Z30.09 BIRTH CONTROL COUNSELING: ICD-10-CM

## 2024-01-04 RX ORDER — ACETAMINOPHEN AND CODEINE PHOSPHATE 120; 12 MG/5ML; MG/5ML
1 SOLUTION ORAL DAILY
Qty: 84 TABLET | Refills: 3 | Status: SHIPPED | OUTPATIENT
Start: 2024-01-04 | End: 2024-12-05

## 2024-01-04 NOTE — PROGRESS NOTES
"POSTPARTUM VISIT    Leoncio Ingram presents today for postpartum visit.  She had a vaginal  delivery on 12/13/23.  Complications included none.  She is breast feeding her infant and reports no issues with such.  She desires POPs for contraception.  She was provided with [place] Depression Screening tool and her score was 0.    Review of Systems:   -Constitutional: denies issues, denies pain   -Breasts: denies tenderness   -Gynecologic: lochia light   -Urinary: denies issues urinating   -GI: stools WNL, denies issues    Physical Exam:   -Vitals:   Vitals:    01/04/24 1345   BP: 102/64   BP Location: Left arm   Pulse: 74   Temp: 97.5 °F (36.4 °C)   TempSrc: Oral   Weight: 83.1 kg (183 lb 3.2 oz)   Height: 5' 2\" (1.575 m)      -General: A&Ox3, no acute distress noted   -Abdomen: soft, non-tender, incision appears clean/dry/intact and well-healed   -Extremities: nontender, no edema noted      Assessment/Plan:  1. Normal postpartum exam.  2. Depression screening negative.  3. Last pap smear was done 06/19/23 and result was negative.  Advise return for next annual GYN exam in June.  4. Contraception: POPs  5.  2 hr GTT ordered for hx A1GDM.       "

## 2024-06-24 ENCOUNTER — TELEPHONE (OUTPATIENT)
Dept: OBGYN CLINIC | Facility: CLINIC | Age: 29
End: 2024-06-24

## 2024-06-24 NOTE — TELEPHONE ENCOUNTER
Patient called office, patient explained she took a pregnancy test and results were positive. Patient also added that she has been bleeding, advised patient to go to hospital